# Patient Record
Sex: MALE | Employment: STUDENT | URBAN - METROPOLITAN AREA
[De-identification: names, ages, dates, MRNs, and addresses within clinical notes are randomized per-mention and may not be internally consistent; named-entity substitution may affect disease eponyms.]

---

## 2017-04-05 ENCOUNTER — OFFICE VISIT (OUTPATIENT)
Dept: FAMILY MEDICINE CLINIC | Facility: CLINIC | Age: 27
End: 2017-04-05

## 2017-04-05 VITALS
RESPIRATION RATE: 13 BRPM | HEART RATE: 74 BPM | SYSTOLIC BLOOD PRESSURE: 92 MMHG | HEIGHT: 70 IN | DIASTOLIC BLOOD PRESSURE: 56 MMHG | OXYGEN SATURATION: 99 % | WEIGHT: 210.63 LBS | TEMPERATURE: 98 F | BODY MASS INDEX: 30.15 KG/M2

## 2017-04-05 DIAGNOSIS — J40 BRONCHITIS: ICD-10-CM

## 2017-04-05 DIAGNOSIS — J01.00 ACUTE NON-RECURRENT MAXILLARY SINUSITIS: Primary | ICD-10-CM

## 2017-04-05 PROCEDURE — 99213 OFFICE O/P EST LOW 20 MIN: CPT | Performed by: PHYSICIAN ASSISTANT

## 2017-04-05 RX ORDER — AMOXICILLIN AND CLAVULANATE POTASSIUM 875; 125 MG/1; MG/1
1 TABLET, FILM COATED ORAL 2 TIMES DAILY
Qty: 20 TABLET | Refills: 0 | Status: SHIPPED | OUTPATIENT
Start: 2017-04-05 | End: 2017-04-15

## 2017-04-05 RX ORDER — FLUTICASONE PROPIONATE 50 MCG
2 SPRAY, SUSPENSION (ML) NASAL DAILY
Qty: 1 INHALER | Refills: 0 | Status: SHIPPED | OUTPATIENT
Start: 2017-04-05 | End: 2017-08-22

## 2017-04-05 RX ORDER — BENZONATATE 200 MG/1
200 CAPSULE ORAL 3 TIMES DAILY PRN
Qty: 30 CAPSULE | Refills: 0 | Status: SHIPPED | OUTPATIENT
Start: 2017-04-05 | End: 2017-04-15

## 2017-04-05 NOTE — PROGRESS NOTES
CHIEF COMPLAINT:   No chief complaint on file. HPI:   Madelin Harden is a 31year old male who presents for URI sxs for 1.5 weeks.   Patient reports nasal congestion-yellow, sinus pain/pressure, respiratory congestion, cough-productive-yellow, SOB EARS: TM's not erythematous, no bulging, no retraction, no fluid, bony landmarks intact. EACs WNL BL. NOSE: Nostrils patent, white nasal discharge, nasal mucosa inflamed   THROAT: oral mucosa pink, moist. Posterior pharynx not erythematous or injected. Bronchitis is an infection of the air passages (bronchial tubes) in your lungs. It often occurs when you have a cold.  This illness is contagious during the first few days and is spread through the air by coughing and sneezing, or by direct contact (touchin Follow up with your healthcare provider, or as advised. If you had an X-ray or ECG (electrocardiogram), a specialist will review it. You will be notified of any new findings that may affect your care.   Note: If you are age 72 or older, or if you have a chr ABRS most often follows an upper respiratory infection caused by a virus. Bacteria then infect the lining of your nasal cavity and sinuses.  But you can also get ABRS if you have:  · Nasal allergies  · Long-term nasal swelling and congestion not caused by a These problems may need to be treated in a hospital with intravenous (IV) antibiotic medicine or surgery.   When to call the health care provider  Call your health care provider if you have any of the following:  · Symptoms that don’t get better, or get wor

## 2017-04-05 NOTE — PATIENT INSTRUCTIONS
-Cool mist humidifier at night  -Warm tea with honey  -Mucinex-D  -Flonase  -Motrin for pain or fever          Bronchitis, Antibiotic Treatment (Adult)    Bronchitis is an infection of the air passages (bronchial tubes) in your lungs.  It often occurs when · Over-the-counter cough, cold, and sore-throat medicines will not shorten the length of the illness, but they may be helpful to reduce symptoms. (Note: Do not use decongestants if you have high blood pressure.)  · Finish all antibiotic medicine.  Do this e The nasal cavity is the large air-filled space behind your nose. The sinuses are a group of spaces formed by the bones of your face. They connect with your nasal cavity. ABRS causes the tissue lining these spaces to become inflamed.  Mucus may not drain nor ABRS may come back or become long-term (chronic).   In rare cases, ABRS may cause complications such as:   · Inflamed tissue around the brain and spinal cord (meningitis)  · Inflamed tissue around the eyes (orbital cellulitis)  · Inflamed bones around the s

## 2017-04-10 ENCOUNTER — OFFICE VISIT (OUTPATIENT)
Dept: FAMILY MEDICINE CLINIC | Facility: CLINIC | Age: 27
End: 2017-04-10

## 2017-04-10 VITALS
WEIGHT: 210.25 LBS | BODY MASS INDEX: 30.1 KG/M2 | SYSTOLIC BLOOD PRESSURE: 104 MMHG | TEMPERATURE: 98 F | DIASTOLIC BLOOD PRESSURE: 68 MMHG | HEIGHT: 70 IN

## 2017-04-10 DIAGNOSIS — G43.109 MIGRAINE WITH AURA AND WITHOUT STATUS MIGRAINOSUS, NOT INTRACTABLE: Primary | ICD-10-CM

## 2017-04-10 DIAGNOSIS — R51.9 FREQUENT HEADACHES: ICD-10-CM

## 2017-04-10 PROCEDURE — 99214 OFFICE O/P EST MOD 30 MIN: CPT | Performed by: FAMILY MEDICINE

## 2017-04-10 RX ORDER — SUMATRIPTAN 100 MG/1
100 TABLET, FILM COATED ORAL EVERY 2 HOUR PRN
Qty: 9 TABLET | Refills: 1 | Status: SHIPPED | OUTPATIENT
Start: 2017-04-10 | End: 2017-08-22

## 2017-04-10 RX ORDER — AMITRIPTYLINE HYDROCHLORIDE 25 MG/1
25 TABLET, FILM COATED ORAL NIGHTLY
Qty: 30 TABLET | Refills: 2 | Status: SHIPPED | OUTPATIENT
Start: 2017-04-10 | End: 2017-08-09

## 2017-04-10 NOTE — PROGRESS NOTES
HPI:    Patient ID: Yuliya Zafar is a 31year old male. Pt c/o headache - freq  W/o fever / chills  4/ month  W/o relief esgic  DCed elavil on his own  HPI    Review of Systems   Constitutional: Negative for fever and chills.    HENT: Negative for congestio encounter diagnosis)  Frequent headaches    No orders of the defined types were placed in this encounter.        Meds This Visit:  Signed Prescriptions Disp Refills    Amitriptyline HCl 25 MG Oral Tab 30 tablet 2      Sig: Take 1 tablet (25 mg total) by fortunato

## 2017-08-09 ENCOUNTER — OFFICE VISIT (OUTPATIENT)
Dept: FAMILY MEDICINE CLINIC | Facility: CLINIC | Age: 27
End: 2017-08-09

## 2017-08-09 VITALS
BODY MASS INDEX: 30.64 KG/M2 | HEART RATE: 64 BPM | WEIGHT: 214 LBS | TEMPERATURE: 98 F | SYSTOLIC BLOOD PRESSURE: 110 MMHG | HEIGHT: 70 IN | OXYGEN SATURATION: 99 % | DIASTOLIC BLOOD PRESSURE: 70 MMHG

## 2017-08-09 DIAGNOSIS — G43.109 MIGRAINE WITH AURA AND WITHOUT STATUS MIGRAINOSUS, NOT INTRACTABLE: Primary | ICD-10-CM

## 2017-08-09 DIAGNOSIS — R51.9 FREQUENT HEADACHES: ICD-10-CM

## 2017-08-09 PROCEDURE — 99214 OFFICE O/P EST MOD 30 MIN: CPT | Performed by: FAMILY MEDICINE

## 2017-08-09 RX ORDER — AMITRIPTYLINE HYDROCHLORIDE 50 MG/1
50 TABLET, FILM COATED ORAL NIGHTLY
Qty: 30 TABLET | Refills: 1 | Status: SHIPPED | OUTPATIENT
Start: 2017-08-09 | End: 2017-10-23

## 2017-08-09 RX ORDER — BUTALBITAL, ACETAMINOPHEN AND CAFFEINE 300; 40; 50 MG/1; MG/1; MG/1
1 CAPSULE ORAL EVERY 4 HOURS PRN
Qty: 30 CAPSULE | Refills: 0 | Status: SHIPPED | OUTPATIENT
Start: 2017-08-09 | End: 2017-08-23

## 2017-08-09 NOTE — PROGRESS NOTES
HPI:    Patient ID: Steve Mcdonald is a 31year old male.   Hit back of head - work prior  X 2 wks since head trauma - auras,neck tight,L temple  + nausea  W/o relief imitrix  W/o stress  Seen ER - CT neck / head neg  HPI    Review of Systems           Current Amitriptyline HCl 50 MG Oral Tab 30 tablet 1      Sig: Take 1 tablet (50 mg total) by mouth nightly. Butalbital-APAP-Caffeine -40 MG Oral Cap 30 capsule 0      Sig: Take 1 capsule by mouth every 4 (four) hours as needed for Pain.            Jes Hwang

## 2017-08-09 NOTE — PATIENT INSTRUCTIONS
Discussed neuro eval.  Increase amitriptyline to 50 mg. Fiorinal as needed headaches. Discussed activations. Call with questions or problems. Reevaluate in 2-4 weeks.

## 2017-08-22 ENCOUNTER — OFFICE VISIT (OUTPATIENT)
Dept: NEUROLOGY | Facility: CLINIC | Age: 27
End: 2017-08-22

## 2017-08-22 VITALS
HEIGHT: 69 IN | DIASTOLIC BLOOD PRESSURE: 77 MMHG | SYSTOLIC BLOOD PRESSURE: 121 MMHG | HEART RATE: 81 BPM | RESPIRATION RATE: 16 BRPM | WEIGHT: 214 LBS | BODY MASS INDEX: 31.7 KG/M2

## 2017-08-22 DIAGNOSIS — R41.0 CONFUSION AND DISORIENTATION: ICD-10-CM

## 2017-08-22 DIAGNOSIS — G43.119 INTRACTABLE MIGRAINE WITH AURA WITHOUT STATUS MIGRAINOSUS: Primary | ICD-10-CM

## 2017-08-22 PROCEDURE — 99244 OFF/OP CNSLTJ NEW/EST MOD 40: CPT | Performed by: OTHER

## 2017-08-22 RX ORDER — RIZATRIPTAN BENZOATE 10 MG/1
10 TABLET ORAL AS NEEDED
Qty: 12 TABLET | Refills: 0 | Status: SHIPPED | OUTPATIENT
Start: 2017-08-22 | End: 2017-09-21

## 2017-08-22 RX ORDER — TOPIRAMATE 25 MG/1
CAPSULE, COATED PELLETS ORAL
Qty: 120 CAPSULE | Refills: 1 | Status: SHIPPED | OUTPATIENT
Start: 2017-08-22 | End: 2017-10-23 | Stop reason: DRUGHIGH

## 2017-08-22 NOTE — PATIENT INSTRUCTIONS
Please schedule EEG prior to next visit  For abortive therapy, when migraine aura starts,  take rizatriptan 10 mg (Maxalt) within first 30 minutes of symptoms starting; if not improved after 2 hours, take additional dose, and then stop taking; monitor for 2-3 business days to fill the prescription. • Patient must present photo ID at time of .     • Written prescriptions picked up on Fridays must be picked up between the hours of 8:00 AM-1:00 PM.     Scheduling Tests    If your physician has ordered the test or put your hair in a ponytail. · You will be asked to relax with your eyes closed for a majority of the test and take a nap, if possible.   · After the test,  the leads will be removed and your head will be cleaned by technologist.  · The test i

## 2017-08-22 NOTE — H&P
[facility] New Patient / Consult Visit    Talia Payment is a 31year old male.                          Referring MD: Rossana Solis    Patient presents with:  Migraine: C/O of getting 3-4 migraines a month      HPI:    Talia Payment is a 31 ye pain, palpitations, shortness of breath, rashes, joint pains, bowel / bladder incontinence or mood issues. Past Medical History:   Diagnosis Date   • Migraines      History reviewed. No pertinent surgical history.   Smoking status: Never Smoker intact    Neck: Supple; full range of motion; no carotid bruits    Mental status:  Alert and oriented to time, place, person, and situation  Speech: fluent  Language: normal naming, repetition, and comprehension  Memory: normal  Attention/concentration: no appears consistent with his prior description with migraine with aura, which I suspect was exacerbated / precipitated by his recent head trauma. He otherwise, does not exhibit post concussive syndrome.   Given normal exam and recent CT brain, no additional [phone]  8/22/2017

## 2017-09-19 ENCOUNTER — NURSE ONLY (OUTPATIENT)
Dept: NEUROLOGY | Facility: CLINIC | Age: 27
End: 2017-09-19

## 2017-09-19 DIAGNOSIS — R41.0 CONFUSION AND DISORIENTATION: Primary | ICD-10-CM

## 2017-09-19 PROCEDURE — 95819 EEG AWAKE AND ASLEEP: CPT | Performed by: OTHER

## 2017-09-22 NOTE — PROCEDURES
[address] in [facility]  in affiliation with [facility]  [address]  [address]  [phone]  Fax [phone]      Name: Sis Johnson  9/25/1990  Date of Study: Epileptiform activity: None    Seizures: none    Events: none    Impression: This EEG is normal.  No epileptiform activity, abnormal slowing or clinical or electrographic seizures were noted on this awake and asleep  recording.      Jessie Brown MD

## 2017-09-26 ENCOUNTER — TELEPHONE (OUTPATIENT)
Dept: NEUROLOGY | Facility: CLINIC | Age: 27
End: 2017-09-26

## 2017-09-26 NOTE — TELEPHONE ENCOUNTER
----- Message from Camila Alejandra MD sent at 9/22/2017  9:41 AM CDT -----  Results noted, EEG normal; let patient know

## 2017-10-23 ENCOUNTER — OFFICE VISIT (OUTPATIENT)
Dept: NEUROLOGY | Facility: CLINIC | Age: 27
End: 2017-10-23

## 2017-10-23 VITALS — DIASTOLIC BLOOD PRESSURE: 68 MMHG | HEART RATE: 62 BPM | SYSTOLIC BLOOD PRESSURE: 106 MMHG | RESPIRATION RATE: 18 BRPM

## 2017-10-23 DIAGNOSIS — G43.119 INTRACTABLE MIGRAINE WITH AURA WITHOUT STATUS MIGRAINOSUS: Primary | ICD-10-CM

## 2017-10-23 PROCEDURE — 99213 OFFICE O/P EST LOW 20 MIN: CPT | Performed by: OTHER

## 2017-10-23 RX ORDER — RIZATRIPTAN BENZOATE 10 MG/1
10 TABLET ORAL AS NEEDED
Qty: 12 TABLET | Refills: 5 | Status: SHIPPED | OUTPATIENT
Start: 2017-10-23 | End: 2018-04-27

## 2017-10-23 RX ORDER — RIZATRIPTAN BENZOATE 10 MG/1
10 TABLET ORAL AS NEEDED
COMMUNITY
End: 2017-10-23

## 2017-10-23 RX ORDER — TOPIRAMATE 50 MG/1
50 TABLET, FILM COATED ORAL 2 TIMES DAILY
COMMUNITY
End: 2017-10-23 | Stop reason: SDUPTHER

## 2017-10-23 RX ORDER — TOPIRAMATE 50 MG/1
50 TABLET, FILM COATED ORAL 2 TIMES DAILY
Qty: 60 TABLET | Refills: 5 | Status: SHIPPED | OUTPATIENT
Start: 2017-10-23 | End: 2018-04-27

## 2017-10-23 NOTE — PATIENT INSTRUCTIONS
Refill policies:    • Allow 2-3 business days for refills; controlled substances may take longer.   • Contact your pharmacy at least 5 days prior to running out of medication and have them send an electronic request or submit request through the [facility] have a procedure or additional testing performed. [facility]) will contact your insurance carrier to obtain pre-certification or prior authorization.     Unfortunately, [facility] has seen an increase in denial of payment even though the p

## 2017-10-23 NOTE — PROGRESS NOTES
[facility] Progress Note    HPI  Patient presents with:  Headache      As per my initial H&P from 8/22/17:    Andra Lino is a 31year old, who presents for evaluation of headaches.        Patient states he has history of migraines sin \"       Overall, since last visit, he has been doing much better; states he had 2 migraines since last visit - each one with visual aura, but no n/v or confusion - with each one, he took Maxalt, with improvement after 1 pill - denied any major side effect acute distress  HEENT: normocephalic  Heart; normal W4/D8, regular rate and rhythm  Lungs: clear to auscultation bilaterally  Extremities: no edema, peripheral pulses intact    Neck: supple, full range of motion; no carotid bruits    Neuro:  Mental status: diagnosis)  Plan: topiramate 50 MG Oral Tab, Rizatriptan Benzoate        10 MG Oral Tab        As noted above    Return in about 6 months (around 4/23/2018).       Devin Rivers MD, Neurology  [facility]  Pager [phone]  10/23/2017

## 2017-11-08 ENCOUNTER — MED REC SCAN ONLY (OUTPATIENT)
Dept: FAMILY MEDICINE CLINIC | Facility: CLINIC | Age: 27
End: 2017-11-08

## 2017-11-27 ENCOUNTER — OFFICE VISIT (OUTPATIENT)
Dept: FAMILY MEDICINE CLINIC | Facility: CLINIC | Age: 27
End: 2017-11-27

## 2017-11-27 VITALS
TEMPERATURE: 98 F | RESPIRATION RATE: 16 BRPM | DIASTOLIC BLOOD PRESSURE: 80 MMHG | OXYGEN SATURATION: 99 % | BODY MASS INDEX: 30 KG/M2 | HEART RATE: 80 BPM | WEIGHT: 201.63 LBS | SYSTOLIC BLOOD PRESSURE: 102 MMHG

## 2017-11-27 DIAGNOSIS — J02.9 SORE THROAT: Primary | ICD-10-CM

## 2017-11-27 DIAGNOSIS — R05.9 COUGH: ICD-10-CM

## 2017-11-27 PROCEDURE — 99213 OFFICE O/P EST LOW 20 MIN: CPT | Performed by: FAMILY MEDICINE

## 2017-11-27 PROCEDURE — 87880 STREP A ASSAY W/OPTIC: CPT | Performed by: FAMILY MEDICINE

## 2017-11-27 PROCEDURE — 87081 CULTURE SCREEN ONLY: CPT | Performed by: FAMILY MEDICINE

## 2017-11-27 RX ORDER — AZITHROMYCIN 250 MG/1
TABLET, FILM COATED ORAL
Qty: 6 TABLET | Refills: 0 | Status: SHIPPED | OUTPATIENT
Start: 2017-11-27 | End: 2018-04-27 | Stop reason: ALTCHOICE

## 2017-11-27 NOTE — PROGRESS NOTES
HPI:    Patient ID: Muna Martinez is a 28year old male. Patient presents with:  Sore Throat: sore throat, cough x 1 week      HPI  Patient is here for cough and sore throat for 8 days. States cough is productive of Greenish sputum.  States symptoms are wo occas       PHYSICAL EXAM:   Physical Exam   Constitutional: He appears well-developed. HENT:   Head: Normocephalic. Right Ear: External ear normal.   Left Ear: External ear normal.   Mouth/Throat: Posterior oropharyngeal erythema present.  No oropharyn

## 2017-11-28 ENCOUNTER — OFFICE VISIT (OUTPATIENT)
Dept: FAMILY MEDICINE CLINIC | Facility: CLINIC | Age: 27
End: 2017-11-28

## 2017-11-28 VITALS
HEART RATE: 64 BPM | TEMPERATURE: 98 F | BODY MASS INDEX: 29 KG/M2 | RESPIRATION RATE: 16 BRPM | WEIGHT: 199 LBS | DIASTOLIC BLOOD PRESSURE: 70 MMHG | SYSTOLIC BLOOD PRESSURE: 120 MMHG

## 2017-11-28 DIAGNOSIS — M25.572 ACUTE LEFT ANKLE PAIN: ICD-10-CM

## 2017-11-28 DIAGNOSIS — S93.412A SPRAIN OF CALCANEOFIBULAR LIGAMENT OF LEFT ANKLE, INITIAL ENCOUNTER: Primary | ICD-10-CM

## 2017-11-28 PROCEDURE — L4350 ANKLE CONTROL ORTHO PRE OTS: HCPCS | Performed by: FAMILY MEDICINE

## 2017-11-28 PROCEDURE — 99214 OFFICE O/P EST MOD 30 MIN: CPT | Performed by: FAMILY MEDICINE

## 2017-11-28 NOTE — PATIENT INSTRUCTIONS
Range of motion exercises reviewed with patient. Alternate ice and heat. Air splint. Call with questions or problems.

## 2017-11-28 NOTE — PROGRESS NOTES
HPI:    Patient ID: Ginette Ma is a 28year old male. 10 days ago twisted L ankle  advil / ice / ace   + swelling ext ankle  W/o foot / Knee pain  HPI    Review of Systems   Musculoskeletal: Negative for joint swelling.    Neurological: Negative for weakn

## 2018-03-17 ENCOUNTER — TELEPHONE (OUTPATIENT)
Dept: FAMILY MEDICINE CLINIC | Facility: CLINIC | Age: 28
End: 2018-03-17

## 2018-03-17 DIAGNOSIS — Z11.1 SCREENING FOR TUBERCULOSIS: Primary | ICD-10-CM

## 2018-03-21 ENCOUNTER — NURSE ONLY (OUTPATIENT)
Dept: FAMILY MEDICINE CLINIC | Facility: CLINIC | Age: 28
End: 2018-03-21

## 2018-03-21 DIAGNOSIS — Z11.1 SCREENING FOR TUBERCULOSIS: Primary | ICD-10-CM

## 2018-03-21 PROCEDURE — 86580 TB INTRADERMAL TEST: CPT | Performed by: FAMILY MEDICINE

## 2018-03-23 ENCOUNTER — NURSE ONLY (OUTPATIENT)
Dept: FAMILY MEDICINE CLINIC | Facility: CLINIC | Age: 28
End: 2018-03-23

## 2018-03-23 DIAGNOSIS — Z11.1 SCREENING FOR TUBERCULOSIS: Primary | ICD-10-CM

## 2018-03-23 LAB — INDURATION (): 0 MM (ref 0–11)

## 2018-04-27 ENCOUNTER — OFFICE VISIT (OUTPATIENT)
Dept: NEUROLOGY | Facility: CLINIC | Age: 28
End: 2018-04-27

## 2018-04-27 VITALS — SYSTOLIC BLOOD PRESSURE: 112 MMHG | RESPIRATION RATE: 18 BRPM | DIASTOLIC BLOOD PRESSURE: 70 MMHG | HEART RATE: 70 BPM

## 2018-04-27 DIAGNOSIS — G43.119 INTRACTABLE MIGRAINE WITH AURA WITHOUT STATUS MIGRAINOSUS: Primary | ICD-10-CM

## 2018-04-27 PROCEDURE — 99213 OFFICE O/P EST LOW 20 MIN: CPT | Performed by: OTHER

## 2018-04-27 RX ORDER — RIZATRIPTAN BENZOATE 10 MG/1
10 TABLET ORAL AS NEEDED
Qty: 12 TABLET | Refills: 5 | Status: SHIPPED | OUTPATIENT
Start: 2018-04-27 | End: 2019-07-17

## 2018-04-27 RX ORDER — TOPIRAMATE 50 MG/1
50 TABLET, FILM COATED ORAL 2 TIMES DAILY
Qty: 60 TABLET | Refills: 5 | Status: SHIPPED | OUTPATIENT
Start: 2018-04-27 | End: 2018-12-05

## 2018-04-27 NOTE — PATIENT INSTRUCTIONS
Refill policies:    • Allow 2-3 business days for refills; controlled substances may take longer.   • Contact your pharmacy at least 5 days prior to running out of medication and have them send an electronic request or submit request through the [facility] entire amount billed. Precertification and Prior Authorizations: If your physician has recommended that you have a procedure or additional testing performed.   [facility]) will contact your insurance carrier to obtain pre-certi

## 2018-04-27 NOTE — PROGRESS NOTES
[facility] Progress Note    HPI  Patient presents with:  Migraine      As per my initial H&P from 8/22/17:    Jenniffer Villasenor is a 31year old, who presents for evaluation of headaches.        Patient states he has history of migraines sin \"          Patient since last visit has been doing well - ~12 migraines in past 6 months - states he averages about 3 migraines a month and does not always have to take Maxalt to help.   When he takes Maxalt 10 mg, it words every time and within ~30 minute bilaterally  Extremities: no edema, peripheral pulses intact    Neck: supple, full range of motion; no carotid bruits    Neuro:  Mental status:  Orientation: Alert and oriented to person, place, time  Speech Fluent and conversational    CN: [facility], [facility] wit [facility]  Pager [phone]  4/27/2018

## 2018-07-07 ENCOUNTER — APPOINTMENT (OUTPATIENT)
Dept: FAMILY MEDICINE CLINIC | Facility: CLINIC | Age: 28
End: 2018-07-07

## 2018-07-07 ENCOUNTER — NURSE ONLY (OUTPATIENT)
Dept: FAMILY MEDICINE CLINIC | Facility: CLINIC | Age: 28
End: 2018-07-07

## 2018-07-07 VITALS
TEMPERATURE: 98 F | RESPIRATION RATE: 20 BRPM | OXYGEN SATURATION: 98 % | DIASTOLIC BLOOD PRESSURE: 78 MMHG | HEART RATE: 80 BPM | SYSTOLIC BLOOD PRESSURE: 118 MMHG

## 2018-07-07 DIAGNOSIS — B30.9 VIRAL CONJUNCTIVITIS OF LEFT EYE: Primary | ICD-10-CM

## 2018-07-07 PROCEDURE — 99202 OFFICE O/P NEW SF 15 MIN: CPT | Performed by: NURSE PRACTITIONER

## 2018-07-07 RX ORDER — TOPIRAMATE 50 MG/1
50 TABLET, FILM COATED ORAL 2 TIMES DAILY
COMMUNITY
End: 2019-01-02 | Stop reason: ALTCHOICE

## 2018-07-07 NOTE — PATIENT INSTRUCTIONS
Viral Conjunctivitis    Viral conjunctivitis (sometimes called pink eye) is a common infection of the eye. It is very contagious. Touching the infected eye, then touching another person passes this infection.  It can also be spread from one eye to the oth · This illness is contagious during the first week. Children with this illness should be kept out of day care and school until the redness clears. Follow-up care  Follow up with your healthcare provider, or as advised.   When to seek medical advice  Call y

## 2018-07-07 NOTE — PROGRESS NOTES
CHIEF COMPLAINT:   Patient presents with:  Eye Problem      HPI:   Arben Magana is a 28year old male who presents with chief complaint of \"pink eye\". Symptoms began  1  days ago. Symptoms have been constant since onset.    Patient reports left eye redness PLAN: Hygiene and comfort care as listed in patient instructions. Medication and instructions as listed below  Warm compresses to affected eye prn. Advised patient to avoid touching eyes; importancestressed  of good handwashing.       Follow up with PCP · You may use acetaminophen or ibuprofen to control pain, unless another medicine was prescribed.  (Note: If you have chronic liver or kidney disease, or if you have ever had a stomach ulcer or gastrointestinal bleeding, talk with your healthcare provider b

## 2018-08-14 ENCOUNTER — OFFICE VISIT (OUTPATIENT)
Dept: FAMILY MEDICINE CLINIC | Facility: CLINIC | Age: 28
End: 2018-08-14
Payer: OTHER GOVERNMENT

## 2018-08-14 VITALS
HEART RATE: 60 BPM | HEIGHT: 69.25 IN | SYSTOLIC BLOOD PRESSURE: 110 MMHG | DIASTOLIC BLOOD PRESSURE: 70 MMHG | RESPIRATION RATE: 12 BRPM | WEIGHT: 209 LBS | BODY MASS INDEX: 30.6 KG/M2 | TEMPERATURE: 98 F

## 2018-08-14 DIAGNOSIS — Z23 NEED FOR VACCINATION: ICD-10-CM

## 2018-08-14 DIAGNOSIS — Z00.00 ROUTINE ADULT HEALTH MAINTENANCE: Primary | ICD-10-CM

## 2018-08-14 PROCEDURE — 90471 IMMUNIZATION ADMIN: CPT | Performed by: FAMILY MEDICINE

## 2018-08-14 PROCEDURE — 90715 TDAP VACCINE 7 YRS/> IM: CPT | Performed by: FAMILY MEDICINE

## 2018-08-14 PROCEDURE — 99395 PREV VISIT EST AGE 18-39: CPT | Performed by: FAMILY MEDICINE

## 2018-08-14 NOTE — PROGRESS NOTES
Justin Rey is a 28year old male. Patient presents with:  Employment Physical: student teaching inrm .  8       HPI:   Px - work  W/o problems or concerns    Current Outpatient Prescriptions on File Prior to Visit:  topiramate 50 MG Oral Tab Take 1 tabl Placed This Encounter      TdaP (Boostrix/Adacel) Vaccine (> 7 Y)    Meds & Refills for this Visit:  No prescriptions requested or ordered in this encounter    Imaging & Consults:  TETANUS, DIPHTHERIA TOXOIDS AND ACELLULAR PERTUSIS VACCINE (TDAP), >7 YEARS

## 2018-12-05 DIAGNOSIS — G43.119 INTRACTABLE MIGRAINE WITH AURA WITHOUT STATUS MIGRAINOSUS: ICD-10-CM

## 2018-12-05 RX ORDER — TOPIRAMATE 50 MG/1
50 TABLET, FILM COATED ORAL 2 TIMES DAILY
Qty: 60 TABLET | Refills: 1 | Status: SHIPPED | OUTPATIENT
Start: 2018-12-05 | End: 2019-01-02 | Stop reason: ALTCHOICE

## 2018-12-05 NOTE — TELEPHONE ENCOUNTER
Medication: Topamax 50 mg    Date of last refill: 04/27/18 with 5 addt refills  [place]     Last office visit: 04/27/18  Due back to clinic per last office note:  RTN in 6 months  Date next office visit scheduled:     Fut

## 2018-12-06 RX ORDER — TOPIRAMATE 100 MG/1
50 TABLET, FILM COATED ORAL 2 TIMES DAILY
Qty: 30 TABLET | Refills: 0 | Status: SHIPPED | OUTPATIENT
Start: 2018-12-06 | End: 2019-01-09 | Stop reason: DRUGHIGH

## 2018-12-06 NOTE — TELEPHONE ENCOUNTER
Received notification that topiramate 50 mg is back ordered; can substitution be made with 100 mg, 0.5 tablets?     Order pended for MD approval.

## 2019-01-02 ENCOUNTER — HOSPITAL ENCOUNTER (OUTPATIENT)
Dept: GENERAL RADIOLOGY | Age: 29
Discharge: HOME | End: 2019-01-02
Attending: FAMILY MEDICINE
Payer: OTHER GOVERNMENT

## 2019-01-02 ENCOUNTER — OFFICE VISIT (OUTPATIENT)
Dept: FAMILY MEDICINE CLINIC | Facility: CLINIC | Age: 29
End: 2019-01-02
Payer: OTHER GOVERNMENT

## 2019-01-02 VITALS
DIASTOLIC BLOOD PRESSURE: 80 MMHG | WEIGHT: 220.13 LBS | OXYGEN SATURATION: 96 % | SYSTOLIC BLOOD PRESSURE: 110 MMHG | TEMPERATURE: 98 F | BODY MASS INDEX: 32 KG/M2 | HEART RATE: 89 BPM

## 2019-01-02 DIAGNOSIS — M25.562 ACUTE PAIN OF LEFT KNEE: ICD-10-CM

## 2019-01-02 DIAGNOSIS — M22.2X2 PATELLOFEMORAL PAIN SYNDROME OF LEFT KNEE: Primary | ICD-10-CM

## 2019-01-02 DIAGNOSIS — M22.2X2 PATELLOFEMORAL PAIN SYNDROME OF LEFT KNEE: ICD-10-CM

## 2019-01-02 PROCEDURE — 99214 OFFICE O/P EST MOD 30 MIN: CPT | Performed by: FAMILY MEDICINE

## 2019-01-02 PROCEDURE — 73560 X-RAY EXAM OF KNEE 1 OR 2: CPT | Performed by: FAMILY MEDICINE

## 2019-01-02 RX ORDER — MELOXICAM 15 MG/1
15 TABLET ORAL DAILY
Qty: 30 TABLET | Refills: 1 | Status: SHIPPED | OUTPATIENT
Start: 2019-01-02 | End: 2019-02-26 | Stop reason: ALTCHOICE

## 2019-01-02 NOTE — PROGRESS NOTES
HPI:    Patient ID: Soy Hunter is a 33year old male.   L knee pain x 1 month  No trauma per pt  Swelled initially  Pain ant knee / pressure feeling  Worse stairs / changing position  HPI    Review of Systems           Current Outpatient Medications:  to

## 2019-01-08 DIAGNOSIS — G43.119 INTRACTABLE MIGRAINE WITH AURA WITHOUT STATUS MIGRAINOSUS: ICD-10-CM

## 2019-01-09 RX ORDER — TOPIRAMATE 50 MG/1
TABLET, FILM COATED ORAL
Qty: 60 TABLET | Refills: 5 | Status: SHIPPED | OUTPATIENT
Start: 2019-01-09 | End: 2019-01-23

## 2019-01-09 NOTE — TELEPHONE ENCOUNTER
Medication: Topamax 50 mg     Date of last refill: 12/06/18  [place]      Last office visit: 04/27/18  Due back to clinic per last office note:  RTN in 6 months  Date next office visit scheduled:           Future Appoint

## 2019-01-23 ENCOUNTER — OFFICE VISIT (OUTPATIENT)
Dept: NEUROLOGY | Facility: CLINIC | Age: 29
End: 2019-01-23
Payer: OTHER GOVERNMENT

## 2019-01-23 VITALS
HEIGHT: 69.25 IN | SYSTOLIC BLOOD PRESSURE: 120 MMHG | RESPIRATION RATE: 16 BRPM | WEIGHT: 220 LBS | HEART RATE: 74 BPM | BODY MASS INDEX: 32.22 KG/M2 | DIASTOLIC BLOOD PRESSURE: 72 MMHG

## 2019-01-23 DIAGNOSIS — G43.119 INTRACTABLE MIGRAINE WITH AURA WITHOUT STATUS MIGRAINOSUS: ICD-10-CM

## 2019-01-23 PROCEDURE — 99213 OFFICE O/P EST LOW 20 MIN: CPT | Performed by: OTHER

## 2019-01-23 RX ORDER — TOPIRAMATE 25 MG/1
25 TABLET ORAL 2 TIMES DAILY
Qty: 60 TABLET | Refills: 2 | Status: SHIPPED | OUTPATIENT
Start: 2019-01-23 | End: 2019-03-20

## 2019-01-23 NOTE — PROGRESS NOTES
[facility]    HPI  Patient presents with:  Migraine: Follow up      As per my initial H&P from 8/22/17:    Crys Rodrigez is a 31year old, who presents for evaluation of headaches.        Patient states he has history of mi mood issues.  \"        Patient currently doing well with regards to migraines - states he may have had 2-3 migraines since last visit; gets aura and dulls when he takes Maxalt - thinks last migraine was 2-3 months ago - was home watching TV - saw declangly 120/72 (BP Location: Left arm, Patient Position: Sitting, Cuff Size: large)   Pulse 74   Resp 16   Ht 69.25\"   Wt 220 lb   BMI 32.25 kg/m²   Estimated body mass index is 32.25 kg/m² as calculated from the following:    Height as of this encounter: 69.25\" subclinical seizures, as well.          He continues to have migraines, but very infrequent and well controled on  Topamax at 50 mg bid, with Rizatriptan 10 mg PRN; given his good control and reported pruritis, will try weaning Topamax to 25 mg bid     (G43

## 2019-01-23 NOTE — PATIENT INSTRUCTIONS
Refill policies:    • Allow 2-3 business days for refills; controlled substances may take longer.   • Contact your pharmacy at least 5 days prior to running out of medication and have them send an electronic request or submit request through the “request re entire amount billed. Precertification and Prior Authorizations: If your physician has recommended that you have a procedure or additional testing performed.   [facility]) will contact your insurance carrier to obtain pre-certi

## 2019-02-26 ENCOUNTER — HOSPITAL ENCOUNTER (OUTPATIENT)
Dept: GENERAL RADIOLOGY | Age: 29
Discharge: HOME | End: 2019-02-26
Attending: FAMILY MEDICINE
Payer: OTHER GOVERNMENT

## 2019-02-26 ENCOUNTER — OFFICE VISIT (OUTPATIENT)
Dept: FAMILY MEDICINE CLINIC | Facility: CLINIC | Age: 29
End: 2019-02-26
Payer: OTHER GOVERNMENT

## 2019-02-26 VITALS
BODY MASS INDEX: 31 KG/M2 | WEIGHT: 212.38 LBS | HEART RATE: 90 BPM | OXYGEN SATURATION: 99 % | DIASTOLIC BLOOD PRESSURE: 80 MMHG | SYSTOLIC BLOOD PRESSURE: 110 MMHG | TEMPERATURE: 98 F

## 2019-02-26 DIAGNOSIS — M25.571 ACUTE RIGHT ANKLE PAIN: ICD-10-CM

## 2019-02-26 DIAGNOSIS — M10.071 ACUTE IDIOPATHIC GOUT OF RIGHT ANKLE: Primary | ICD-10-CM

## 2019-02-26 PROCEDURE — 99214 OFFICE O/P EST MOD 30 MIN: CPT | Performed by: FAMILY MEDICINE

## 2019-02-26 PROCEDURE — 96372 THER/PROPH/DIAG INJ SC/IM: CPT | Performed by: FAMILY MEDICINE

## 2019-02-26 PROCEDURE — 73610 X-RAY EXAM OF ANKLE: CPT | Performed by: FAMILY MEDICINE

## 2019-02-26 RX ORDER — KETOROLAC TROMETHAMINE 30 MG/ML
60 INJECTION, SOLUTION INTRAMUSCULAR; INTRAVENOUS ONCE
Status: COMPLETED | OUTPATIENT
Start: 2019-02-26 | End: 2019-02-26

## 2019-02-26 RX ADMIN — KETOROLAC TROMETHAMINE 60 MG: 30 INJECTION, SOLUTION INTRAMUSCULAR; INTRAVENOUS at 16:07:00

## 2019-03-20 DIAGNOSIS — G43.119 INTRACTABLE MIGRAINE WITH AURA WITHOUT STATUS MIGRAINOSUS: ICD-10-CM

## 2019-03-20 NOTE — TELEPHONE ENCOUNTER
Pt's topiramate dose was lowered to 25 mg BID, but he started getting migraines again. Pt went back to previous dose of 50 mg BID and migraines are gone. He needs a new rx, with higher dose, sent to [facility] in [place].

## 2019-03-20 NOTE — TELEPHONE ENCOUNTER
Medication: Topiramate    Date of last refill: 1/23/2019 (#60/2)  [place] for [place]    Last office visit: 1/23/2019, dose decreased at office visit  Due back to clinic per last office note:  RTC in 6 months

## 2019-03-22 RX ORDER — TOPIRAMATE 50 MG/1
50 TABLET, FILM COATED ORAL 2 TIMES DAILY
Qty: 60 TABLET | Refills: 3 | Status: SHIPPED | OUTPATIENT
Start: 2019-03-22 | End: 2019-04-25

## 2019-03-29 ENCOUNTER — TELEPHONE (OUTPATIENT)
Dept: FAMILY MEDICINE CLINIC | Facility: CLINIC | Age: 29
End: 2019-03-29

## 2019-03-29 NOTE — TELEPHONE ENCOUNTER
----- Message from Cora Feliz sent at 3/29/2019 12:12 PM CDT -----  Contact: ESSIE FOUNTAIN MISSED A CALL FROM BOGDAN, PLEASE CALL HER BACK.

## 2019-03-29 NOTE — TELEPHONE ENCOUNTER
MAGALY WAS IN THE ER [facility] MORNING @0400 AT [address]. THEY DID CONFIRM HE HAS GOUT.   WILL  PRESCRIBE A MED FOR HIM WITHOUT HIM BEING SEEN?

## 2019-03-29 NOTE — TELEPHONE ENCOUNTER
Uric acid- 9.7 at [facility] on 3/27/2019. Advised- push water; stop drinking pop and alcohol; see Dr Jean Whitaker next week.   Basilio/cj    [id number]

## 2019-04-01 ENCOUNTER — OFFICE VISIT (OUTPATIENT)
Dept: FAMILY MEDICINE CLINIC | Facility: CLINIC | Age: 29
End: 2019-04-01
Payer: OTHER GOVERNMENT

## 2019-04-01 VITALS
BODY MASS INDEX: 30.62 KG/M2 | DIASTOLIC BLOOD PRESSURE: 78 MMHG | HEART RATE: 80 BPM | OXYGEN SATURATION: 99 % | WEIGHT: 211.5 LBS | HEIGHT: 69.5 IN | SYSTOLIC BLOOD PRESSURE: 112 MMHG | TEMPERATURE: 99 F

## 2019-04-01 DIAGNOSIS — M10.071 IDIOPATHIC GOUT OF RIGHT ANKLE, UNSPECIFIED CHRONICITY: Primary | ICD-10-CM

## 2019-04-01 DIAGNOSIS — E79.0 HYPERURICEMIA: ICD-10-CM

## 2019-04-01 PROCEDURE — 99214 OFFICE O/P EST MOD 30 MIN: CPT | Performed by: FAMILY MEDICINE

## 2019-04-01 RX ORDER — INDOMETHACIN 50 MG/1
50 CAPSULE ORAL
Qty: 60 CAPSULE | Refills: 0 | Status: SHIPPED | OUTPATIENT
Start: 2019-04-01 | End: 2020-08-10

## 2019-04-01 RX ORDER — ALLOPURINOL 300 MG/1
300 TABLET ORAL DAILY
Qty: 90 TABLET | Refills: 0 | Status: SHIPPED | OUTPATIENT
Start: 2019-04-01 | End: 2019-04-27

## 2019-04-01 RX ORDER — INDOMETHACIN 50 MG/1
50 CAPSULE ORAL
COMMUNITY
Start: 2019-03-27 | End: 2019-05-06

## 2019-04-01 RX ORDER — INDOMETHACIN 50 MG/1
50 CAPSULE ORAL ONCE
Qty: 60 CAPSULE | Refills: 3 | Status: SHIPPED | OUTPATIENT
Start: 2019-04-01 | End: 2019-04-01 | Stop reason: CLARIF

## 2019-04-01 NOTE — PATIENT INSTRUCTIONS
Increase water  Discussed diet  Cont indocin x 1 more week  Check BASIC MET / URIC ACID 6 wks  Call ?  Or problems

## 2019-04-25 DIAGNOSIS — G43.119 INTRACTABLE MIGRAINE WITH AURA WITHOUT STATUS MIGRAINOSUS: ICD-10-CM

## 2019-04-25 RX ORDER — TOPIRAMATE 50 MG/1
50 TABLET, FILM COATED ORAL 2 TIMES DAILY
Qty: 180 TABLET | Refills: 1 | Status: SHIPPED | OUTPATIENT
Start: 2019-04-25 | End: 2019-08-02

## 2019-04-25 RX ORDER — TOPIRAMATE 50 MG/1
50 TABLET, FILM COATED ORAL 2 TIMES DAILY
Qty: 180 TABLET | Refills: 1 | OUTPATIENT
Start: 2019-04-25

## 2019-04-25 NOTE — TELEPHONE ENCOUNTER
Medication: Topamax 50 mg  Date of last refill: 03/22/19 with 3 addt refills  [place]     Last office visit: 1/23/2019  Due back to clinic per last office note:  RTN in 6 months  Date next office visit scheduled:     Misael

## 2019-04-27 ENCOUNTER — TELEPHONE (OUTPATIENT)
Dept: FAMILY MEDICINE CLINIC | Facility: CLINIC | Age: 29
End: 2019-04-27

## 2019-04-27 DIAGNOSIS — M10.071 IDIOPATHIC GOUT OF RIGHT ANKLE, UNSPECIFIED CHRONICITY: ICD-10-CM

## 2019-04-27 DIAGNOSIS — E79.0 HYPERURICEMIA: ICD-10-CM

## 2019-04-27 RX ORDER — ALLOPURINOL 300 MG/1
300 TABLET ORAL DAILY
Qty: 90 TABLET | Refills: 0 | Status: SHIPPED | OUTPATIENT
Start: 2019-04-27 | End: 2019-07-15

## 2019-05-06 ENCOUNTER — OFFICE VISIT (OUTPATIENT)
Dept: FAMILY MEDICINE CLINIC | Facility: CLINIC | Age: 29
End: 2019-05-06
Payer: OTHER GOVERNMENT

## 2019-05-06 VITALS
DIASTOLIC BLOOD PRESSURE: 78 MMHG | HEART RATE: 92 BPM | WEIGHT: 211 LBS | TEMPERATURE: 98 F | SYSTOLIC BLOOD PRESSURE: 120 MMHG | OXYGEN SATURATION: 98 % | BODY MASS INDEX: 31 KG/M2

## 2019-05-06 DIAGNOSIS — M10.071 IDIOPATHIC GOUT OF RIGHT ANKLE, UNSPECIFIED CHRONICITY: Primary | ICD-10-CM

## 2019-05-06 DIAGNOSIS — E79.0 HYPERURICEMIA: ICD-10-CM

## 2019-05-06 PROCEDURE — 80048 BASIC METABOLIC PNL TOTAL CA: CPT | Performed by: FAMILY MEDICINE

## 2019-05-06 PROCEDURE — 36415 COLL VENOUS BLD VENIPUNCTURE: CPT | Performed by: FAMILY MEDICINE

## 2019-05-06 PROCEDURE — 99214 OFFICE O/P EST MOD 30 MIN: CPT | Performed by: FAMILY MEDICINE

## 2019-05-06 PROCEDURE — 84550 ASSAY OF BLOOD/URIC ACID: CPT | Performed by: FAMILY MEDICINE

## 2019-05-06 NOTE — PROGRESS NOTES
HPI:    Patient ID: Vasyl Cummings is a 33year old male. Pt doing well w/ meds  Pain improved  Some stiffness persists  W/o redness  Over all better  HPI    Review of Systems   Musculoskeletal: Negative for joint swelling.    Neurological: Negative for wea

## 2019-05-06 NOTE — PATIENT INSTRUCTIONS
Range of motion exercises reviewed with right ankle. Isometrics. Alternate ice and heat. Roll calf twice daily. Massage foot and calf daily. Continue current medications. Call with laboratory studies. Call with questions or problems.

## 2019-05-07 DIAGNOSIS — E79.0 HYPERURICEMIA: Primary | ICD-10-CM

## 2019-05-08 DIAGNOSIS — M10.071 IDIOPATHIC GOUT OF RIGHT ANKLE, UNSPECIFIED CHRONICITY: ICD-10-CM

## 2019-05-08 DIAGNOSIS — E79.0 HYPERURICEMIA: Primary | ICD-10-CM

## 2019-07-14 DIAGNOSIS — M10.071 IDIOPATHIC GOUT OF RIGHT ANKLE, UNSPECIFIED CHRONICITY: ICD-10-CM

## 2019-07-14 DIAGNOSIS — E79.0 HYPERURICEMIA: ICD-10-CM

## 2019-07-14 RX ORDER — ALLOPURINOL 300 MG/1
TABLET ORAL
Qty: 90 TABLET | Refills: 0 | Status: CANCELLED | OUTPATIENT
Start: 2019-07-14

## 2019-07-15 RX ORDER — ALLOPURINOL 300 MG/1
TABLET ORAL
Qty: 90 TABLET | Refills: 0 | Status: SHIPPED | OUTPATIENT
Start: 2019-07-15 | End: 2019-07-17

## 2019-07-17 ENCOUNTER — OFFICE VISIT (OUTPATIENT)
Dept: FAMILY MEDICINE CLINIC | Facility: CLINIC | Age: 29
End: 2019-07-17
Payer: OTHER GOVERNMENT

## 2019-07-17 VITALS
HEART RATE: 72 BPM | SYSTOLIC BLOOD PRESSURE: 110 MMHG | BODY MASS INDEX: 30.71 KG/M2 | DIASTOLIC BLOOD PRESSURE: 68 MMHG | OXYGEN SATURATION: 98 % | HEIGHT: 69.5 IN | TEMPERATURE: 98 F | RESPIRATION RATE: 18 BRPM | WEIGHT: 212.13 LBS

## 2019-07-17 DIAGNOSIS — M10.071 IDIOPATHIC GOUT OF RIGHT ANKLE, UNSPECIFIED CHRONICITY: ICD-10-CM

## 2019-07-17 DIAGNOSIS — Z00.00 ROUTINE ADULT HEALTH MAINTENANCE: Primary | ICD-10-CM

## 2019-07-17 DIAGNOSIS — E79.0 HYPERURICEMIA: ICD-10-CM

## 2019-07-17 DIAGNOSIS — G43.119 INTRACTABLE MIGRAINE WITH AURA WITHOUT STATUS MIGRAINOSUS: ICD-10-CM

## 2019-07-17 PROCEDURE — 99395 PREV VISIT EST AGE 18-39: CPT | Performed by: FAMILY MEDICINE

## 2019-07-17 RX ORDER — ALLOPURINOL 300 MG/1
TABLET ORAL
Qty: 90 TABLET | Refills: 0 | Status: SHIPPED | OUTPATIENT
Start: 2019-07-17 | End: 2019-11-09

## 2019-07-17 RX ORDER — RIZATRIPTAN BENZOATE 10 MG/1
10 TABLET ORAL AS NEEDED
Qty: 12 TABLET | Refills: 5 | Status: SHIPPED | OUTPATIENT
Start: 2019-07-17 | End: 2021-09-28

## 2019-07-17 NOTE — PROGRESS NOTES
Viji Woody is a 33year old male.   Patient presents with:  Physical: pre employment physical...room 8      HPI:   Work Px  W/o problems or concerns    Current Outpatient Medications on File Prior to Visit:  topiramate 50 MG Oral Tab Take 1 tablet (50 mg unspecified chronicity  Hyperuricemia  Routine adult health maintenance  (primary encounter diagnosis)    No orders of the defined types were placed in this encounter.       Meds & Refills for this Visit:  Requested Prescriptions     Signed Prescriptions Yuly Tracey

## 2019-07-17 NOTE — TELEPHONE ENCOUNTER
Medication: Rizatriptan     Date of last refill: 04/27/2018 (#12/5)  [place]     Last office visit: 1/23/2019  Due back to clinic per last office note: 6 months    Date next office visit scheduled:    Future Appointments

## 2019-08-02 ENCOUNTER — OFFICE VISIT (OUTPATIENT)
Dept: NEUROLOGY | Facility: CLINIC | Age: 29
End: 2019-08-02
Payer: OTHER GOVERNMENT

## 2019-08-02 VITALS
SYSTOLIC BLOOD PRESSURE: 110 MMHG | BODY MASS INDEX: 31 KG/M2 | RESPIRATION RATE: 16 BRPM | DIASTOLIC BLOOD PRESSURE: 72 MMHG | WEIGHT: 215 LBS | HEART RATE: 60 BPM

## 2019-08-02 DIAGNOSIS — G43.119 INTRACTABLE MIGRAINE WITH AURA WITHOUT STATUS MIGRAINOSUS: Primary | ICD-10-CM

## 2019-08-02 PROCEDURE — 99213 OFFICE O/P EST LOW 20 MIN: CPT | Performed by: OTHER

## 2019-08-02 RX ORDER — TOPIRAMATE 50 MG/1
50 TABLET, FILM COATED ORAL 2 TIMES DAILY
Qty: 180 TABLET | Refills: 1 | Status: SHIPPED | OUTPATIENT
Start: 2019-08-02 | End: 2020-08-10

## 2019-08-02 NOTE — PROGRESS NOTES
[facility] Progress Note    HPI  Patient presents with:  Migraine      As per my initial H&P from 8/22/17:    Jenniffer Villasenor is a 31year old, who presents for evaluation of headaches.        Patient states he has history of migraines sin \"        Patient states he is doing well currently; he is back on Topamax 50 mg bid as he tried to reduce it down to 25 mg bid but had recurrence of migraines up to 7 times in 2 weeks.      He states his itching sensation has improved as well and is resolv (three) times daily with meals. (Patient taking differently: Take 50 mg by mouth 3 (three) times daily with meals. Only taken during gout flare up ), Disp: 60 capsule, Rfl: 0    Review of Systems:  No chest pain or palpitations; otherwise as noted in HPI. originally presented 8/2017, for evaluation and management. Neurologic exam remains normal and nonfocal and CT brain with no evidence for secondary intracranial pathology.    EEG has been done and was negative for epileptiform activity or subclinical

## 2019-08-02 NOTE — PROGRESS NOTES
Pt here for migraine follow up. Pt reports he is doing well. Tried to reduce topamax to 25mg BID, but migraines returned. Back to 50mg BID and migraines are well controlled. Only has a migraine once every 2-3 months, which responds to Rizatriptan.

## 2019-08-09 ENCOUNTER — OFFICE VISIT (OUTPATIENT)
Dept: FAMILY MEDICINE CLINIC | Facility: CLINIC | Age: 29
End: 2019-08-09
Payer: OTHER GOVERNMENT

## 2019-08-09 ENCOUNTER — TELEPHONE (OUTPATIENT)
Dept: FAMILY MEDICINE CLINIC | Facility: CLINIC | Age: 29
End: 2019-08-09

## 2019-08-09 VITALS
WEIGHT: 219 LBS | HEART RATE: 60 BPM | DIASTOLIC BLOOD PRESSURE: 70 MMHG | RESPIRATION RATE: 12 BRPM | SYSTOLIC BLOOD PRESSURE: 118 MMHG | TEMPERATURE: 98 F | BODY MASS INDEX: 31.71 KG/M2 | HEIGHT: 69.5 IN

## 2019-08-09 DIAGNOSIS — M25.562 ACUTE PAIN OF LEFT KNEE: Primary | ICD-10-CM

## 2019-08-09 PROCEDURE — 99213 OFFICE O/P EST LOW 20 MIN: CPT | Performed by: NURSE PRACTITIONER

## 2019-08-09 NOTE — PROGRESS NOTES
HPI:   Knee Pain    Pain location: Left Knee. This is a new problem. Episode onset: Wednesday. There has been no history of extremity trauma. The problem has been gradually worsening (especially since last night at 8pm).  The quality of the pain is descri stiffness. See HPI   Skin: Negative for itching. Neurological: Negative for tingling and numbness.         EXAM:   /70 (BP Location: Right arm, Patient Position: Sitting, Cuff Size: adult)   Pulse 60   Temp 98.4 °F (36.9 °C) (Temporal)   Resp

## 2019-08-09 NOTE — TELEPHONE ENCOUNTER
Patient is in [employer]. Has to go to training this weekend. Has \"PT\" test which includes a 2 mile run, 2 minutes of situps, and 2 minutes of push ups.

## 2019-08-09 NOTE — TELEPHONE ENCOUNTER
PATIENT HERE IN LOBBY     NEEDS DETAILED NOTE FOR WORK () TO RESTRICT PHYSICAL ACTIVITY FOR HOW EVER LONG YOU THINK WOULD BE BEST.         NEEDS TODAY PLEASE

## 2019-08-09 NOTE — TELEPHONE ENCOUNTER
I would not recommend physical activity for 1 week. Should follow up next Friday (in am when xray available) and we can reassess at that time.

## 2019-11-09 DIAGNOSIS — M10.071 IDIOPATHIC GOUT OF RIGHT ANKLE, UNSPECIFIED CHRONICITY: ICD-10-CM

## 2019-11-09 DIAGNOSIS — E79.0 HYPERURICEMIA: ICD-10-CM

## 2019-11-09 DIAGNOSIS — G43.119 INTRACTABLE MIGRAINE WITH AURA WITHOUT STATUS MIGRAINOSUS: ICD-10-CM

## 2019-11-09 RX ORDER — ALLOPURINOL 300 MG/1
TABLET ORAL
Qty: 90 TABLET | Refills: 4 | Status: SHIPPED | OUTPATIENT
Start: 2019-11-09 | End: 2019-11-29 | Stop reason: DRUGHIGH

## 2019-11-09 NOTE — TELEPHONE ENCOUNTER
Last refill #90 7/17  last office visit 8/9/19  No future appointments.   Patient is due for labs this month  Reminder letter sent

## 2019-11-11 RX ORDER — TOPIRAMATE 50 MG/1
TABLET, FILM COATED ORAL
Qty: 180 TABLET | Refills: 1 | Status: SHIPPED | OUTPATIENT
Start: 2019-11-11 | End: 2020-07-24

## 2019-11-11 NOTE — TELEPHONE ENCOUNTER
Medication: Topiramate 50 mg    Date of last [id number]/02/2019 with 1 addt refill      Last office visit: 8/2/2019  Due back to clinic per last office note:  RTN in 6 months  Date next office visit scheduled:      Last OV note recommendation: Per Dr. Coker Eth

## 2019-11-27 ENCOUNTER — APPOINTMENT (OUTPATIENT)
Dept: LAB | Age: 29
End: 2019-11-27
Attending: FAMILY MEDICINE
Payer: OTHER GOVERNMENT

## 2019-11-27 DIAGNOSIS — M10.071 IDIOPATHIC GOUT OF RIGHT ANKLE, UNSPECIFIED CHRONICITY: ICD-10-CM

## 2019-11-27 DIAGNOSIS — E79.0 HYPERURICEMIA: ICD-10-CM

## 2019-11-27 PROCEDURE — 36415 COLL VENOUS BLD VENIPUNCTURE: CPT | Performed by: FAMILY MEDICINE

## 2019-11-27 PROCEDURE — 80048 BASIC METABOLIC PNL TOTAL CA: CPT | Performed by: FAMILY MEDICINE

## 2019-11-27 PROCEDURE — 84550 ASSAY OF BLOOD/URIC ACID: CPT | Performed by: FAMILY MEDICINE

## 2019-11-29 DIAGNOSIS — E79.0 HYPERURICEMIA: ICD-10-CM

## 2019-11-29 DIAGNOSIS — M10.071 IDIOPATHIC GOUT OF RIGHT ANKLE, UNSPECIFIED CHRONICITY: Primary | ICD-10-CM

## 2019-11-29 RX ORDER — ALLOPURINOL 300 MG/1
150 TABLET ORAL DAILY
Qty: 1 TABLET | Refills: 0 | COMMUNITY
Start: 2019-11-29 | End: 2021-12-27

## 2020-03-20 ENCOUNTER — TELEPHONE (OUTPATIENT)
Dept: FAMILY MEDICINE CLINIC | Facility: CLINIC | Age: 30
End: 2020-03-20

## 2020-03-20 DIAGNOSIS — R05.9 COUGH: ICD-10-CM

## 2020-03-20 DIAGNOSIS — J01.90 ACUTE NON-RECURRENT SINUSITIS, UNSPECIFIED LOCATION: Primary | ICD-10-CM

## 2020-03-20 DIAGNOSIS — R09.82 PND (POST-NASAL DRIP): ICD-10-CM

## 2020-03-20 PROCEDURE — 99441 PHONE E/M BY PHYS 5-10 MIN: CPT | Performed by: NURSE PRACTITIONER

## 2020-03-20 RX ORDER — AZITHROMYCIN 250 MG/1
TABLET, FILM COATED ORAL
Qty: 6 TABLET | Refills: 0 | Status: SHIPPED | OUTPATIENT
Start: 2020-03-20 | End: 2020-08-10

## 2020-03-20 NOTE — TELEPHONE ENCOUNTER
Virtual/Telephone Check-In    Vasyl Cummings verbally consents to a Virtual/Telephone Check-In service on 03/20/20. Patient understands and accepts financial responsibility for any deductible, co-insurance and/or co-pays associated with this service.     Du

## 2020-03-20 NOTE — TELEPHONE ENCOUNTER
Runny nose, stuffy, cough productive since last Weds. Has been taking delsym and Mucinex. Uses [facility]    [facility]. Informed tele-medicine charge or phone call from nurse.

## 2020-07-20 ENCOUNTER — OFFICE VISIT (OUTPATIENT)
Dept: FAMILY MEDICINE CLINIC | Facility: CLINIC | Age: 30
End: 2020-07-20
Payer: OTHER GOVERNMENT

## 2020-07-20 VITALS
HEART RATE: 62 BPM | DIASTOLIC BLOOD PRESSURE: 80 MMHG | HEIGHT: 69.5 IN | WEIGHT: 227 LBS | BODY MASS INDEX: 32.87 KG/M2 | SYSTOLIC BLOOD PRESSURE: 128 MMHG | TEMPERATURE: 98 F

## 2020-07-20 DIAGNOSIS — M94.0 COSTOCHONDRITIS: Primary | ICD-10-CM

## 2020-07-20 PROCEDURE — 3079F DIAST BP 80-89 MM HG: CPT | Performed by: FAMILY MEDICINE

## 2020-07-20 PROCEDURE — 3008F BODY MASS INDEX DOCD: CPT | Performed by: FAMILY MEDICINE

## 2020-07-20 PROCEDURE — 99213 OFFICE O/P EST LOW 20 MIN: CPT | Performed by: FAMILY MEDICINE

## 2020-07-20 PROCEDURE — 3074F SYST BP LT 130 MM HG: CPT | Performed by: FAMILY MEDICINE

## 2020-07-20 RX ORDER — MELOXICAM 15 MG/1
15 TABLET ORAL DAILY
Qty: 14 TABLET | Refills: 0 | Status: SHIPPED | OUTPATIENT
Start: 2020-07-20 | End: 2020-08-03

## 2020-07-20 NOTE — PROGRESS NOTES
Kenisha Antony is a 26year old male.   Patient presents with:  Chest Pressure      Chief Complaint Reviewed and Verified  No Further Nursing Notes to Review    Tobacco Reviewed  Allergies Reviewed  Medications Reviewed  Problem   List Reviewed  Medical Hist kg)  08/09/19 : 219 lb (99.3 kg)  08/02/19 : 215 lb (97.5 kg)  07/17/19 : 212 lb 2 oz (96.2 kg)  05/06/19 : 211 lb (95.7 kg)  04/01/19 : 211 lb 8 oz (95.9 kg)      REVIEW OF SYSTEMS:   GENERAL HEALTH: feels well no complaints  SKIN: denies any unusual skin

## 2020-07-24 DIAGNOSIS — G43.119 INTRACTABLE MIGRAINE WITH AURA WITHOUT STATUS MIGRAINOSUS: ICD-10-CM

## 2020-07-24 RX ORDER — TOPIRAMATE 50 MG/1
50 TABLET, FILM COATED ORAL 2 TIMES DAILY
Qty: 180 TABLET | Refills: 1 | Status: SHIPPED | OUTPATIENT
Start: 2020-07-24 | End: 2020-08-10

## 2020-07-24 NOTE — TELEPHONE ENCOUNTER
Medication: topiramate 50 MG Oral Tab    Date of last refill: 8/2/2019 (#180/1)  [place]    Last office visit: 8/2/2019  Due back to clinic per last office note:  6 months  Date next office visit scheduled:    Future

## 2020-08-10 ENCOUNTER — OFFICE VISIT (OUTPATIENT)
Dept: NEUROLOGY | Facility: CLINIC | Age: 30
End: 2020-08-10
Payer: OTHER GOVERNMENT

## 2020-08-10 VITALS
HEART RATE: 64 BPM | DIASTOLIC BLOOD PRESSURE: 68 MMHG | TEMPERATURE: 98 F | SYSTOLIC BLOOD PRESSURE: 112 MMHG | RESPIRATION RATE: 18 BRPM

## 2020-08-10 DIAGNOSIS — G43.119 INTRACTABLE MIGRAINE WITH AURA WITHOUT STATUS MIGRAINOSUS: Primary | ICD-10-CM

## 2020-08-10 PROCEDURE — 3078F DIAST BP <80 MM HG: CPT | Performed by: OTHER

## 2020-08-10 PROCEDURE — 3074F SYST BP LT 130 MM HG: CPT | Performed by: OTHER

## 2020-08-10 PROCEDURE — 99213 OFFICE O/P EST LOW 20 MIN: CPT | Performed by: OTHER

## 2020-08-10 RX ORDER — TOPIRAMATE 50 MG/1
50 TABLET, FILM COATED ORAL 2 TIMES DAILY
Qty: 180 TABLET | Refills: 3 | Status: SHIPPED | OUTPATIENT
Start: 2020-08-10 | End: 2021-11-22

## 2020-08-10 NOTE — PROGRESS NOTES
[facility] Progress Note    HPI  Patient presents with:  Migraine      As per my initial H&P from 8/22/17:    Magnus Alvarado is a 31year old, who presents for evaluation of headaches.        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   Aura lasts 20-30 minutes and headache improved in ~1-2 hrs withou palpitations; otherwise as noted in HPI. Exam:  /68   Pulse 64   Temp 97.9 °F (36.6 °C)   Resp 18   Estimated body mass index is 33.04 kg/m² as calculated from the following:    Height as of 7/20/20: 69.5\".     Weight as of 7/20/20: 227 lb (103 kg at 50 mg bid, with Rizatriptan 10 mg PRN; he was not able to tolerate weaning Topamax as he had recurrence of severe migraine and prior pruritis previously improved with increased water intake - will continue same dose of 50 mg bid for prevention.      (M09

## 2020-11-24 ENCOUNTER — MED REC SCAN ONLY (OUTPATIENT)
Dept: FAMILY MEDICINE CLINIC | Facility: CLINIC | Age: 30
End: 2020-11-24

## 2020-11-24 ENCOUNTER — LAB ENCOUNTER (OUTPATIENT)
Dept: LAB | Age: 30
End: 2020-11-24
Attending: FAMILY MEDICINE
Payer: OTHER GOVERNMENT

## 2020-11-24 ENCOUNTER — OFFICE VISIT (OUTPATIENT)
Dept: FAMILY MEDICINE CLINIC | Facility: CLINIC | Age: 30
End: 2020-11-24
Payer: OTHER GOVERNMENT

## 2020-11-24 VITALS
DIASTOLIC BLOOD PRESSURE: 80 MMHG | WEIGHT: 234 LBS | BODY MASS INDEX: 33.88 KG/M2 | HEIGHT: 69.5 IN | HEART RATE: 64 BPM | TEMPERATURE: 98 F | SYSTOLIC BLOOD PRESSURE: 110 MMHG

## 2020-11-24 DIAGNOSIS — M10.071 IDIOPATHIC GOUT OF RIGHT ANKLE, UNSPECIFIED CHRONICITY: ICD-10-CM

## 2020-11-24 DIAGNOSIS — Z22.6 HTLV-1 CARRIER: ICD-10-CM

## 2020-11-24 DIAGNOSIS — Z00.00 ROUTINE HEALTH MAINTENANCE: ICD-10-CM

## 2020-11-24 DIAGNOSIS — E79.0 HYPERURICEMIA: ICD-10-CM

## 2020-11-24 DIAGNOSIS — Z00.00 ROUTINE HEALTH MAINTENANCE: Primary | ICD-10-CM

## 2020-11-24 PROCEDURE — 3079F DIAST BP 80-89 MM HG: CPT | Performed by: FAMILY MEDICINE

## 2020-11-24 PROCEDURE — 85025 COMPLETE CBC W/AUTO DIFF WBC: CPT | Performed by: FAMILY MEDICINE

## 2020-11-24 PROCEDURE — 3074F SYST BP LT 130 MM HG: CPT | Performed by: FAMILY MEDICINE

## 2020-11-24 PROCEDURE — 99395 PREV VISIT EST AGE 18-39: CPT | Performed by: FAMILY MEDICINE

## 2020-11-24 PROCEDURE — 36415 COLL VENOUS BLD VENIPUNCTURE: CPT | Performed by: FAMILY MEDICINE

## 2020-11-24 PROCEDURE — 84550 ASSAY OF BLOOD/URIC ACID: CPT | Performed by: FAMILY MEDICINE

## 2020-11-24 PROCEDURE — 83721 ASSAY OF BLOOD LIPOPROTEIN: CPT | Performed by: FAMILY MEDICINE

## 2020-11-24 PROCEDURE — 3008F BODY MASS INDEX DOCD: CPT | Performed by: FAMILY MEDICINE

## 2020-11-24 PROCEDURE — 86790 VIRUS ANTIBODY NOS: CPT | Performed by: FAMILY MEDICINE

## 2020-11-24 PROCEDURE — 80053 COMPREHEN METABOLIC PANEL: CPT | Performed by: FAMILY MEDICINE

## 2020-11-24 PROCEDURE — 80061 LIPID PANEL: CPT | Performed by: FAMILY MEDICINE

## 2020-11-24 NOTE — PROGRESS NOTES
Viji Woody is a 31year old male. Patient presents with:  Physical      HPI:     Physical.  Patient doing well. Recently gave blood and was found to test positive to screening for HT LV antibodies. Confirmatory test was inconclusive.   Patient without 85.6 kg (188 lb 10.2 oz)    GENERAL: well developed, well nourished,in no apparent distress  SKIN: no rashes,no suspicious lesions  HEENT: atraumatic, normocephalic, R TM normal, L TM normal, Pharynx normal  NECK: supple, no cervical adenopathy  LUNGS: akila

## 2020-11-25 DIAGNOSIS — E79.0 HYPERURICEMIA: Primary | ICD-10-CM

## 2021-06-08 ENCOUNTER — OFFICE VISIT (OUTPATIENT)
Dept: FAMILY MEDICINE CLINIC | Facility: CLINIC | Age: 31
End: 2021-06-08
Payer: OTHER GOVERNMENT

## 2021-06-08 VITALS — HEART RATE: 94 BPM | OXYGEN SATURATION: 97 % | TEMPERATURE: 98 F

## 2021-06-08 DIAGNOSIS — J01.90 ACUTE NON-RECURRENT SINUSITIS, UNSPECIFIED LOCATION: Primary | ICD-10-CM

## 2021-06-08 PROCEDURE — 99213 OFFICE O/P EST LOW 20 MIN: CPT | Performed by: FAMILY MEDICINE

## 2021-06-08 RX ORDER — AMOXICILLIN 875 MG/1
875 TABLET, COATED ORAL 2 TIMES DAILY
Qty: 20 TABLET | Refills: 0 | Status: SHIPPED | OUTPATIENT
Start: 2021-06-08 | End: 2021-12-27

## 2021-06-08 NOTE — PROGRESS NOTES
Carla Lake is a 31year old male. Patient presents with:  Cough: wet cough, mucus, nasal congestion, post nasal drip-started approx 6 days ago-fully vaccinated. HPI:   Patient started with nasal congestion that lasted about 1 week.   Then he develo apparent distress  SKIN: no rashes,no suspicious lesions  HEENT: atraumatic, normocephalic, R TM normal, L TM normal, Pharynx with PND  NECK: supple, no cervical adenopathy  LUNGS: clear to auscultation  CARDIO: RRR without murmur  ABD soft, nontender, nor

## 2021-09-28 DIAGNOSIS — G43.119 INTRACTABLE MIGRAINE WITH AURA WITHOUT STATUS MIGRAINOSUS: ICD-10-CM

## 2021-09-28 RX ORDER — RIZATRIPTAN BENZOATE 10 MG/1
10 TABLET ORAL AS NEEDED
Qty: 12 TABLET | Refills: 0 | Status: SHIPPED | OUTPATIENT
Start: 2021-09-28 | End: 2021-12-27

## 2021-09-28 RX ORDER — RIZATRIPTAN BENZOATE 10 MG/1
10 TABLET ORAL AS NEEDED
Qty: 12 TABLET | Refills: 0 | Status: CANCELLED | OUTPATIENT
Start: 2021-09-28

## 2021-09-28 RX ORDER — TOPIRAMATE 50 MG/1
50 TABLET, FILM COATED ORAL 2 TIMES DAILY
Qty: 60 TABLET | Refills: 0 | Status: CANCELLED | OUTPATIENT
Start: 2021-09-28

## 2021-09-28 NOTE — TELEPHONE ENCOUNTER
Pt is out. He needs medication ASAP   He know he needs to make an appointment. He will call later to make one.

## 2021-09-28 NOTE — TELEPHONE ENCOUNTER
Medication: Rizatriptan 10 mg    Date of last refill: 07/17/2019 with 5 ddt refills  [place]     Last office visit:08/10/20  Due back to clinic per last office note:  RTN in   Date next office visit scheduled:  No future

## 2021-10-25 ENCOUNTER — TELEPHONE (OUTPATIENT)
Dept: FAMILY MEDICINE CLINIC | Facility: CLINIC | Age: 31
End: 2021-10-25

## 2021-10-25 NOTE — TELEPHONE ENCOUNTER
Pt. Scheduled appt. Through My chart for:   Gastrointestinal issues after eating bread. Is this ok to be seen in regular office?

## 2021-10-27 ENCOUNTER — OFFICE VISIT (OUTPATIENT)
Dept: FAMILY MEDICINE CLINIC | Facility: CLINIC | Age: 31
End: 2021-10-27
Payer: OTHER GOVERNMENT

## 2021-10-27 VITALS
HEIGHT: 69.5 IN | BODY MASS INDEX: 32.9 KG/M2 | RESPIRATION RATE: 16 BRPM | WEIGHT: 227.25 LBS | OXYGEN SATURATION: 99 % | HEART RATE: 88 BPM | TEMPERATURE: 98 F | DIASTOLIC BLOOD PRESSURE: 78 MMHG | SYSTOLIC BLOOD PRESSURE: 116 MMHG

## 2021-10-27 DIAGNOSIS — R10.9 ABDOMINAL CRAMPING: Primary | ICD-10-CM

## 2021-10-27 DIAGNOSIS — E66.09 CLASS 1 OBESITY DUE TO EXCESS CALORIES WITHOUT SERIOUS COMORBIDITY WITH BODY MASS INDEX (BMI) OF 33.0 TO 33.9 IN ADULT: ICD-10-CM

## 2021-10-27 PROCEDURE — 3008F BODY MASS INDEX DOCD: CPT | Performed by: FAMILY MEDICINE

## 2021-10-27 PROCEDURE — 99214 OFFICE O/P EST MOD 30 MIN: CPT | Performed by: FAMILY MEDICINE

## 2021-10-27 PROCEDURE — 3078F DIAST BP <80 MM HG: CPT | Performed by: FAMILY MEDICINE

## 2021-10-27 PROCEDURE — 3074F SYST BP LT 130 MM HG: CPT | Performed by: FAMILY MEDICINE

## 2021-10-27 NOTE — PATIENT INSTRUCTIONS
Discussed diet, exercise, weight loss. Low-carb, sugar eating. Persistent or recurrent symptoms patient to call. Call with questions or problems. Reassurance given. Continue with working with weight loss.

## 2021-10-27 NOTE — PROGRESS NOTES
Subjective:   Patient ID: Federica Balderrama is a 27year old male. Pt 2 episodes of abd cramps / diarrhea after bread over the last 2 wks  W/o other problems  Solids associated with heavy meals. Rich foods. Trying to diet, lose weight.   Without other proble

## 2021-11-20 DIAGNOSIS — G43.119 INTRACTABLE MIGRAINE WITH AURA WITHOUT STATUS MIGRAINOSUS: ICD-10-CM

## 2021-11-22 RX ORDER — TOPIRAMATE 50 MG/1
TABLET, FILM COATED ORAL
Qty: 60 TABLET | Refills: 0 | Status: SHIPPED | OUTPATIENT
Start: 2021-11-22 | End: 2021-12-23

## 2021-11-22 NOTE — TELEPHONE ENCOUNTER
Patient called back, per schedule needed to be put on for late December.  Currently scheduled for 12/27/21

## 2021-11-22 NOTE — TELEPHONE ENCOUNTER
Medication: Topiramate 50 mg     Date of last refill: 08/10/21 with 3 addt refills  [place]      Last office visit:08/10/20  Due back to clinic per last office note:  RTN in   Date next office visit scheduled:  No future

## 2021-12-23 DIAGNOSIS — G43.119 INTRACTABLE MIGRAINE WITH AURA WITHOUT STATUS MIGRAINOSUS: ICD-10-CM

## 2021-12-23 RX ORDER — TOPIRAMATE 50 MG/1
50 TABLET, FILM COATED ORAL 2 TIMES DAILY
Qty: 60 TABLET | Refills: 2 | Status: SHIPPED | OUTPATIENT
Start: 2021-12-23

## 2021-12-23 NOTE — TELEPHONE ENCOUNTER
Medication: Topiramate     Date of last refill: 11/22/2021 (#60/0)  [place]     Last office visit: 8/10/2020  Due back to clinic per last office note:  RTC in one year  Date next office visit sched

## 2021-12-27 ENCOUNTER — OFFICE VISIT (OUTPATIENT)
Dept: NEUROLOGY | Facility: CLINIC | Age: 31
End: 2021-12-27
Payer: OTHER GOVERNMENT

## 2021-12-27 VITALS
HEIGHT: 69.5 IN | SYSTOLIC BLOOD PRESSURE: 113 MMHG | DIASTOLIC BLOOD PRESSURE: 74 MMHG | BODY MASS INDEX: 32.87 KG/M2 | WEIGHT: 227 LBS | HEART RATE: 83 BPM | RESPIRATION RATE: 16 BRPM

## 2021-12-27 DIAGNOSIS — G43.119 INTRACTABLE MIGRAINE WITH AURA WITHOUT STATUS MIGRAINOSUS: ICD-10-CM

## 2021-12-27 PROCEDURE — 3008F BODY MASS INDEX DOCD: CPT | Performed by: OTHER

## 2021-12-27 PROCEDURE — 99213 OFFICE O/P EST LOW 20 MIN: CPT | Performed by: OTHER

## 2021-12-27 PROCEDURE — 3074F SYST BP LT 130 MM HG: CPT | Performed by: OTHER

## 2021-12-27 PROCEDURE — 3078F DIAST BP <80 MM HG: CPT | Performed by: OTHER

## 2021-12-27 RX ORDER — RIZATRIPTAN BENZOATE 10 MG/1
10 TABLET ORAL AS NEEDED
Qty: 12 TABLET | Refills: 5 | Status: SHIPPED | OUTPATIENT
Start: 2021-12-27

## 2021-12-27 NOTE — PROGRESS NOTES
[facility] Progress Note    HPI  Patient presents with:  Migraine: Follow up         As per my initial H&P from 8/22/17:    Janis Herring is a 31year old, who presents for evaluation of headaches.        Patient states he has history of or mood issues. \"      Patient last seen 8/2020. 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   He states he had impro topiramate 50 MG Oral Tab, Take 1 tablet (50 mg total) by mouth 2 (two) times daily. , Disp: 60 tablet, Rfl: 2    Review of Systems:  No chest pain or palpitations; otherwise as noted in HPI.     Exam:  /74 (BP Location: Left arm, Patient Position: Sit nonfocal and CT brain with no evidence for secondary intracranial pathology. EEG has been done and was negative for epileptiform activity or subclinical seizures, as well.      Migraines are infrequent and well controled overall on Topamax at 50 mg bid, w

## 2022-01-25 DIAGNOSIS — G43.119 INTRACTABLE MIGRAINE WITH AURA WITHOUT STATUS MIGRAINOSUS: ICD-10-CM

## 2022-01-26 RX ORDER — TOPIRAMATE 50 MG/1
TABLET, FILM COATED ORAL
Qty: 180 TABLET | Refills: 0 | OUTPATIENT
Start: 2022-01-26

## 2022-01-26 NOTE — TELEPHONE ENCOUNTER
Topiramate 50mg refilled on 12/23/21, #60 w/ 2R  Pt not due for refill    RN, please refuse as refill not due until ~3/2022

## 2022-03-28 RX ORDER — TOPIRAMATE 50 MG/1
50 TABLET, FILM COATED ORAL 2 TIMES DAILY
Qty: 60 TABLET | Refills: 2 | Status: SHIPPED | OUTPATIENT
Start: 2022-03-28

## 2022-06-06 ENCOUNTER — OFFICE VISIT (OUTPATIENT)
Dept: FAMILY MEDICINE CLINIC | Facility: CLINIC | Age: 32
End: 2022-06-06
Payer: OTHER GOVERNMENT

## 2022-06-06 VITALS
TEMPERATURE: 97 F | RESPIRATION RATE: 16 BRPM | OXYGEN SATURATION: 99 % | DIASTOLIC BLOOD PRESSURE: 81 MMHG | HEART RATE: 68 BPM | WEIGHT: 225 LBS | SYSTOLIC BLOOD PRESSURE: 124 MMHG | HEIGHT: 69 IN | BODY MASS INDEX: 33.33 KG/M2

## 2022-06-06 DIAGNOSIS — J02.9 SORE THROAT: Primary | ICD-10-CM

## 2022-06-06 LAB — CONTROL LINE PRESENT WITH A CLEAR BACKGROUND (YES/NO): YES YES/NO

## 2022-06-07 LAB — SARS-COV-2 RNA RESP QL NAA+PROBE: NOT DETECTED

## 2022-06-23 ENCOUNTER — OFFICE VISIT (OUTPATIENT)
Dept: FAMILY MEDICINE CLINIC | Facility: CLINIC | Age: 32
End: 2022-06-23
Payer: OTHER GOVERNMENT

## 2022-06-23 VITALS
TEMPERATURE: 98 F | BODY MASS INDEX: 34.51 KG/M2 | OXYGEN SATURATION: 98 % | HEART RATE: 74 BPM | HEIGHT: 69 IN | WEIGHT: 233 LBS | SYSTOLIC BLOOD PRESSURE: 118 MMHG | DIASTOLIC BLOOD PRESSURE: 62 MMHG

## 2022-06-23 DIAGNOSIS — B35.3 TINEA PEDIS OF BOTH FEET: Primary | ICD-10-CM

## 2022-06-23 PROCEDURE — 3008F BODY MASS INDEX DOCD: CPT | Performed by: FAMILY MEDICINE

## 2022-06-23 PROCEDURE — 3078F DIAST BP <80 MM HG: CPT | Performed by: FAMILY MEDICINE

## 2022-06-23 PROCEDURE — 3074F SYST BP LT 130 MM HG: CPT | Performed by: FAMILY MEDICINE

## 2022-06-23 PROCEDURE — 99213 OFFICE O/P EST LOW 20 MIN: CPT | Performed by: FAMILY MEDICINE

## 2022-06-23 RX ORDER — TERBINAFINE HYDROCHLORIDE 250 MG/1
250 TABLET ORAL DAILY
Qty: 30 TABLET | Refills: 0 | Status: SHIPPED | OUTPATIENT
Start: 2022-06-23 | End: 2022-09-15

## 2022-06-30 DIAGNOSIS — G43.119 INTRACTABLE MIGRAINE WITH AURA WITHOUT STATUS MIGRAINOSUS: ICD-10-CM

## 2022-06-30 RX ORDER — TOPIRAMATE 50 MG/1
TABLET, FILM COATED ORAL
Qty: 60 TABLET | Refills: 5 | Status: SHIPPED | OUTPATIENT
Start: 2022-06-30

## 2022-11-19 ENCOUNTER — OFFICE VISIT (OUTPATIENT)
Dept: FAMILY MEDICINE CLINIC | Facility: CLINIC | Age: 32
End: 2022-11-19
Payer: OTHER GOVERNMENT

## 2022-11-19 VITALS
TEMPERATURE: 98 F | DIASTOLIC BLOOD PRESSURE: 70 MMHG | OXYGEN SATURATION: 99 % | RESPIRATION RATE: 18 BRPM | BODY MASS INDEX: 33.33 KG/M2 | HEIGHT: 69 IN | HEART RATE: 61 BPM | WEIGHT: 225 LBS | SYSTOLIC BLOOD PRESSURE: 112 MMHG

## 2022-11-19 DIAGNOSIS — J06.9 VIRAL URI WITH COUGH: Primary | ICD-10-CM

## 2022-11-19 PROCEDURE — 3008F BODY MASS INDEX DOCD: CPT | Performed by: PHYSICIAN ASSISTANT

## 2022-11-19 PROCEDURE — 99213 OFFICE O/P EST LOW 20 MIN: CPT | Performed by: PHYSICIAN ASSISTANT

## 2022-11-19 PROCEDURE — 3078F DIAST BP <80 MM HG: CPT | Performed by: PHYSICIAN ASSISTANT

## 2022-11-19 PROCEDURE — 3074F SYST BP LT 130 MM HG: CPT | Performed by: PHYSICIAN ASSISTANT

## 2022-11-19 RX ORDER — FLUTICASONE PROPIONATE 50 MCG
2 SPRAY, SUSPENSION (ML) NASAL DAILY
Qty: 1 EACH | Refills: 0 | Status: SHIPPED | OUTPATIENT
Start: 2022-11-19 | End: 2023-11-14

## 2022-11-19 NOTE — PATIENT INSTRUCTIONS
Flonase:  2 sprays in each nostril daily, or 1 spray in each nostril twice daily. If you develop a nosebleed, stop medication and restart at half the dose (1 spray in each nostril daily) after you have not had a nosebleed for 2 days. If nosebleed recurs, then stop medication completely. May alternate with Afrin (or other nasal decongestant). 2.  Encourage fluids, humidifier/vaporizor at bedside, elevate head of bed (sleep with extra pillow), vapor rub to chest, steam therapy if no fever, warm compresses for sinus pressure if no fever, salt water gargles for sore throat, lozenges for sore throat, may try over the counter saline nasal spray or irrigation kit (use distilled water with irrigation kit) for sinus pressure/congestion, get plenty of rest.          3.  If cough is productive, recommend over the counter Mucinex to help thin secretions and encourage productive cough to clear chest congestion. If cough becomes dry or non-productive, then stop Mucinex and start over-the-counter Delsym to suppress or stop cough.

## 2022-12-11 ENCOUNTER — APPOINTMENT (OUTPATIENT)
Dept: GENERAL RADIOLOGY | Age: 32
End: 2022-12-11
Attending: PHYSICIAN ASSISTANT
Payer: OTHER GOVERNMENT

## 2022-12-11 ENCOUNTER — HOSPITAL ENCOUNTER (OUTPATIENT)
Age: 32
Discharge: HOME | End: 2022-12-11
Payer: OTHER GOVERNMENT

## 2022-12-11 VITALS
RESPIRATION RATE: 18 BRPM | HEART RATE: 65 BPM | OXYGEN SATURATION: 98 % | DIASTOLIC BLOOD PRESSURE: 77 MMHG | TEMPERATURE: 97 F | SYSTOLIC BLOOD PRESSURE: 108 MMHG

## 2022-12-11 DIAGNOSIS — S82.892A CLOSED FRACTURE OF MALLEOLUS OF LEFT ANKLE, INITIAL ENCOUNTER: Primary | ICD-10-CM

## 2022-12-11 PROCEDURE — 99213 OFFICE O/P EST LOW 20 MIN: CPT | Performed by: PHYSICIAN ASSISTANT

## 2022-12-11 PROCEDURE — E0114 CRUTCH UNDERARM PAIR NO WOOD: HCPCS | Performed by: PHYSICIAN ASSISTANT

## 2022-12-11 PROCEDURE — 73610 X-RAY EXAM OF ANKLE: CPT | Performed by: PHYSICIAN ASSISTANT

## 2022-12-11 PROCEDURE — 29515 APPLICATION SHORT LEG SPLINT: CPT | Performed by: PHYSICIAN ASSISTANT

## 2022-12-12 ENCOUNTER — TELEPHONE (OUTPATIENT)
Dept: ORTHOPEDICS CLINIC | Facility: CLINIC | Age: 32
End: 2022-12-12

## 2022-12-13 ENCOUNTER — OFFICE VISIT (OUTPATIENT)
Dept: ORTHOPEDICS CLINIC | Facility: CLINIC | Age: 32
End: 2022-12-13
Payer: OTHER GOVERNMENT

## 2022-12-13 ENCOUNTER — PATIENT MESSAGE (OUTPATIENT)
Dept: ORTHOPEDICS CLINIC | Facility: CLINIC | Age: 32
End: 2022-12-13

## 2022-12-13 VITALS — WEIGHT: 225 LBS | HEIGHT: 69 IN | BODY MASS INDEX: 33.33 KG/M2

## 2022-12-13 DIAGNOSIS — S82.892A CLOSED AVULSION FRACTURE OF LEFT ANKLE, INITIAL ENCOUNTER: Primary | ICD-10-CM

## 2022-12-13 PROCEDURE — 3008F BODY MASS INDEX DOCD: CPT | Performed by: PHYSICIAN ASSISTANT

## 2022-12-13 PROCEDURE — 99213 OFFICE O/P EST LOW 20 MIN: CPT | Performed by: PHYSICIAN ASSISTANT

## 2022-12-13 NOTE — TELEPHONE ENCOUNTER
From: Tavia Gleason  To: Sebastian Alonzo  Sent: 12/13/2022 4:10 PM CST  Subject: Walking Boot Question    Good Afternoon,     After I got home I had a few other questions I thought of. Should I remove the boot for showering, sleeping, etc? I know you mentioned icing 20 min 3 times a day, should the boot be removed for that as well?      Thank you for your time,   Vanessa Jarvis

## 2023-01-04 DIAGNOSIS — G43.119 INTRACTABLE MIGRAINE WITH AURA WITHOUT STATUS MIGRAINOSUS: ICD-10-CM

## 2023-01-04 RX ORDER — TOPIRAMATE 50 MG/1
TABLET, FILM COATED ORAL
Qty: 60 TABLET | Refills: 5 | Status: CANCELLED | OUTPATIENT
Start: 2023-01-04

## 2023-01-05 DIAGNOSIS — G43.119 INTRACTABLE MIGRAINE WITH AURA WITHOUT STATUS MIGRAINOSUS: ICD-10-CM

## 2023-01-05 RX ORDER — TOPIRAMATE 50 MG/1
TABLET, FILM COATED ORAL
Qty: 60 TABLET | Refills: 0 | Status: SHIPPED | OUTPATIENT
Start: 2023-01-05

## 2023-01-05 NOTE — TELEPHONE ENCOUNTER
Topiramate 50mg refilled 1/5/23, #60/0R to requested pharmacy    RN, please refuse as this is a duplicate request. #48 day not appropriate, pt due for appt

## 2023-01-09 RX ORDER — TOPIRAMATE 50 MG/1
TABLET, FILM COATED ORAL
Qty: 180 TABLET | Refills: 0 | OUTPATIENT
Start: 2023-01-09

## 2023-01-10 ENCOUNTER — OFFICE VISIT (OUTPATIENT)
Dept: ORTHOPEDICS CLINIC | Facility: CLINIC | Age: 33
End: 2023-01-10
Payer: OTHER GOVERNMENT

## 2023-01-10 DIAGNOSIS — S82.892D CLOSED AVULSION FRACTURE OF LEFT ANKLE WITH ROUTINE HEALING, SUBSEQUENT ENCOUNTER: Primary | ICD-10-CM

## 2023-01-10 DIAGNOSIS — S93.409A SPRAIN OF LATERAL LIGAMENT OF ANKLE JOINT: ICD-10-CM

## 2023-01-10 PROCEDURE — 99213 OFFICE O/P EST LOW 20 MIN: CPT | Performed by: PHYSICIAN ASSISTANT

## 2023-02-20 DIAGNOSIS — G43.119 INTRACTABLE MIGRAINE WITH AURA WITHOUT STATUS MIGRAINOSUS: ICD-10-CM

## 2023-02-20 RX ORDER — TOPIRAMATE 50 MG/1
50 TABLET, FILM COATED ORAL 2 TIMES DAILY
Qty: 60 TABLET | Refills: 0 | Status: CANCELLED | OUTPATIENT
Start: 2023-02-20

## 2023-02-21 DIAGNOSIS — G43.119 INTRACTABLE MIGRAINE WITH AURA WITHOUT STATUS MIGRAINOSUS: ICD-10-CM

## 2023-02-22 RX ORDER — TOPIRAMATE 50 MG/1
TABLET, FILM COATED ORAL
Qty: 180 TABLET | Refills: 0 | OUTPATIENT
Start: 2023-02-22

## 2023-02-23 RX ORDER — TOPIRAMATE 50 MG/1
TABLET, FILM COATED ORAL
Qty: 60 TABLET | Refills: 0 | OUTPATIENT
Start: 2023-02-23

## 2023-04-05 ENCOUNTER — OFFICE VISIT (OUTPATIENT)
Dept: NEUROLOGY | Facility: CLINIC | Age: 33
End: 2023-04-05
Payer: OTHER GOVERNMENT

## 2023-04-05 VITALS
BODY MASS INDEX: 33.33 KG/M2 | HEART RATE: 96 BPM | SYSTOLIC BLOOD PRESSURE: 98 MMHG | HEIGHT: 69 IN | DIASTOLIC BLOOD PRESSURE: 68 MMHG | WEIGHT: 225 LBS | RESPIRATION RATE: 16 BRPM

## 2023-04-05 DIAGNOSIS — G43.119 INTRACTABLE MIGRAINE WITH AURA WITHOUT STATUS MIGRAINOSUS: Primary | ICD-10-CM

## 2023-04-05 PROCEDURE — 99213 OFFICE O/P EST LOW 20 MIN: CPT | Performed by: OTHER

## 2023-04-05 PROCEDURE — 3008F BODY MASS INDEX DOCD: CPT | Performed by: OTHER

## 2023-04-05 PROCEDURE — 3078F DIAST BP <80 MM HG: CPT | Performed by: OTHER

## 2023-04-05 PROCEDURE — 3074F SYST BP LT 130 MM HG: CPT | Performed by: OTHER

## 2023-04-05 RX ORDER — TOPIRAMATE 50 MG/1
50 TABLET, FILM COATED ORAL 2 TIMES DAILY
Qty: 180 TABLET | Refills: 3 | Status: SHIPPED | OUTPATIENT
Start: 2023-04-05

## 2023-06-01 ENCOUNTER — OFFICE VISIT (OUTPATIENT)
Dept: FAMILY MEDICINE CLINIC | Facility: CLINIC | Age: 33
End: 2023-06-01
Payer: OTHER GOVERNMENT

## 2023-06-01 VITALS
HEIGHT: 69 IN | HEART RATE: 60 BPM | BODY MASS INDEX: 30.07 KG/M2 | SYSTOLIC BLOOD PRESSURE: 110 MMHG | WEIGHT: 203 LBS | DIASTOLIC BLOOD PRESSURE: 70 MMHG | TEMPERATURE: 98 F | OXYGEN SATURATION: 99 %

## 2023-06-01 DIAGNOSIS — M25.512 ACUTE PAIN OF LEFT SHOULDER: ICD-10-CM

## 2023-06-01 DIAGNOSIS — S46.812A SUPRASPINATUS SPRAIN, LEFT, INITIAL ENCOUNTER: Primary | ICD-10-CM

## 2023-06-01 PROCEDURE — 99214 OFFICE O/P EST MOD 30 MIN: CPT | Performed by: FAMILY MEDICINE

## 2023-06-01 PROCEDURE — 3074F SYST BP LT 130 MM HG: CPT | Performed by: FAMILY MEDICINE

## 2023-06-01 PROCEDURE — 3008F BODY MASS INDEX DOCD: CPT | Performed by: FAMILY MEDICINE

## 2023-06-01 PROCEDURE — 3078F DIAST BP <80 MM HG: CPT | Performed by: FAMILY MEDICINE

## 2023-06-01 PROCEDURE — 98925 OSTEOPATH MANJ 1-2 REGIONS: CPT | Performed by: FAMILY MEDICINE

## 2023-06-07 NOTE — PROGRESS NOTES
HPI:    Patient ID: Juanito Ma is a 33year old male. Pt seen ER  Acute gout attack last wk  W/o trauma  Hx freq attacks pt believes - R ankle    HPI    Review of Systems   Constitutional: Negative for chills and fever. HENT: Negative.     Respiratory SpO2 99%   BMI 30.79 kg/m²            ASSESSMENT/PLAN:   Idiopathic gout of right ankle, unspecified chronicity  (primary encounter diagnosis)  Hyperuricemia    No orders of the defined types were placed in this encounter.       Meds This Visit:  Requested [place] Pregnancy And Lactation Text: There is insufficient data to evaluate drug-associated risk for major birth defects, miscarriage, or other adverse maternal or fetal outcomes.  There is a pregnancy registry that monitors pregnancy outcomes in pregnant persons exposed to the medication during pregnancy.  It is unknown if this medication is excreted in breast milk.  Do not breastfeed during treatment and for about 4 weeks after the last dose.

## 2023-11-05 ENCOUNTER — HOSPITAL ENCOUNTER (OUTPATIENT)
Age: 33
Discharge: HOME | End: 2023-11-05
Payer: OTHER GOVERNMENT

## 2023-11-05 VITALS
TEMPERATURE: 98 F | WEIGHT: 210 LBS | HEART RATE: 57 BPM | OXYGEN SATURATION: 99 % | HEIGHT: 70 IN | RESPIRATION RATE: 18 BRPM | BODY MASS INDEX: 30.06 KG/M2 | DIASTOLIC BLOOD PRESSURE: 75 MMHG | SYSTOLIC BLOOD PRESSURE: 111 MMHG

## 2023-11-05 DIAGNOSIS — J06.9 VIRAL URI WITH COUGH: Primary | ICD-10-CM

## 2023-11-05 RX ORDER — BENZONATATE 100 MG/1
100 CAPSULE ORAL 3 TIMES DAILY PRN
Qty: 15 CAPSULE | Refills: 0 | Status: SHIPPED | OUTPATIENT
Start: 2023-11-05

## 2023-11-05 NOTE — ED INITIAL ASSESSMENT (HPI)
Pt with c/o dry persistent cough x 1 week.    Pt has tried delsym and cough drops without improvement

## 2023-11-07 ENCOUNTER — OFFICE VISIT (OUTPATIENT)
Dept: FAMILY MEDICINE CLINIC | Facility: CLINIC | Age: 33
End: 2023-11-07
Payer: OTHER GOVERNMENT

## 2023-11-07 VITALS
HEIGHT: 70 IN | SYSTOLIC BLOOD PRESSURE: 120 MMHG | DIASTOLIC BLOOD PRESSURE: 70 MMHG | OXYGEN SATURATION: 99 % | HEART RATE: 72 BPM | TEMPERATURE: 98 F | BODY MASS INDEX: 30.06 KG/M2 | WEIGHT: 210 LBS

## 2023-11-07 DIAGNOSIS — L30.9 ECZEMA, UNSPECIFIED TYPE: Primary | ICD-10-CM

## 2023-11-07 PROCEDURE — 3008F BODY MASS INDEX DOCD: CPT | Performed by: FAMILY MEDICINE

## 2023-11-07 PROCEDURE — 3078F DIAST BP <80 MM HG: CPT | Performed by: FAMILY MEDICINE

## 2023-11-07 PROCEDURE — 3074F SYST BP LT 130 MM HG: CPT | Performed by: FAMILY MEDICINE

## 2023-11-07 PROCEDURE — 99213 OFFICE O/P EST LOW 20 MIN: CPT | Performed by: FAMILY MEDICINE

## 2023-11-07 RX ORDER — CLOBETASOL PROPIONATE 0.5 MG/G
CREAM TOPICAL
Qty: 45 G | Refills: 1 | Status: SHIPPED | OUTPATIENT
Start: 2023-11-07

## 2023-11-12 ENCOUNTER — APPOINTMENT (OUTPATIENT)
Dept: GENERAL RADIOLOGY | Age: 33
End: 2023-11-12
Attending: NURSE PRACTITIONER
Payer: OTHER GOVERNMENT

## 2023-11-12 ENCOUNTER — HOSPITAL ENCOUNTER (OUTPATIENT)
Age: 33
Discharge: HOME | End: 2023-11-12
Payer: OTHER GOVERNMENT

## 2023-11-12 VITALS
HEART RATE: 67 BPM | RESPIRATION RATE: 18 BRPM | BODY MASS INDEX: 30.06 KG/M2 | HEIGHT: 70 IN | DIASTOLIC BLOOD PRESSURE: 86 MMHG | OXYGEN SATURATION: 100 % | SYSTOLIC BLOOD PRESSURE: 128 MMHG | WEIGHT: 210 LBS | TEMPERATURE: 97 F

## 2023-11-12 DIAGNOSIS — R05.1 ACUTE COUGH: Primary | ICD-10-CM

## 2023-11-12 DIAGNOSIS — J06.9 VIRAL UPPER RESPIRATORY TRACT INFECTION: ICD-10-CM

## 2023-11-12 PROCEDURE — 71046 X-RAY EXAM CHEST 2 VIEWS: CPT | Performed by: NURSE PRACTITIONER

## 2023-11-12 PROCEDURE — 99204 OFFICE O/P NEW MOD 45 MIN: CPT | Performed by: NURSE PRACTITIONER

## 2023-11-12 RX ORDER — PREDNISONE 20 MG/1
20 TABLET ORAL DAILY
Qty: 5 TABLET | Refills: 0 | Status: SHIPPED | OUTPATIENT
Start: 2023-11-12 | End: 2023-11-17

## 2023-11-12 RX ORDER — ALBUTEROL SULFATE 90 UG/1
2 AEROSOL, METERED RESPIRATORY (INHALATION) EVERY 4 HOURS PRN
Qty: 1 EACH | Refills: 0 | Status: SHIPPED | OUTPATIENT
Start: 2023-11-12 | End: 2023-12-12

## 2023-12-27 ENCOUNTER — OFFICE VISIT (OUTPATIENT)
Dept: FAMILY MEDICINE CLINIC | Facility: CLINIC | Age: 33
End: 2023-12-27
Payer: OTHER GOVERNMENT

## 2023-12-27 VITALS
TEMPERATURE: 98 F | BODY MASS INDEX: 29.35 KG/M2 | SYSTOLIC BLOOD PRESSURE: 110 MMHG | OXYGEN SATURATION: 97 % | HEART RATE: 73 BPM | RESPIRATION RATE: 18 BRPM | WEIGHT: 205 LBS | DIASTOLIC BLOOD PRESSURE: 72 MMHG | HEIGHT: 70 IN

## 2023-12-27 DIAGNOSIS — J34.89 SINUS PRESSURE: Primary | ICD-10-CM

## 2023-12-27 DIAGNOSIS — J06.9 UPPER RESPIRATORY TRACT INFECTION, UNSPECIFIED TYPE: ICD-10-CM

## 2023-12-27 LAB
OPERATOR ID: NORMAL
POCT LOT NUMBER: NORMAL
RAPID SARS-COV-2 BY PCR: NOT DETECTED

## 2023-12-27 PROCEDURE — 3078F DIAST BP <80 MM HG: CPT | Performed by: NURSE PRACTITIONER

## 2023-12-27 PROCEDURE — U0002 COVID-19 LAB TEST NON-CDC: HCPCS | Performed by: NURSE PRACTITIONER

## 2023-12-27 PROCEDURE — 99213 OFFICE O/P EST LOW 20 MIN: CPT | Performed by: NURSE PRACTITIONER

## 2023-12-27 PROCEDURE — 3008F BODY MASS INDEX DOCD: CPT | Performed by: NURSE PRACTITIONER

## 2023-12-27 PROCEDURE — 3074F SYST BP LT 130 MM HG: CPT | Performed by: NURSE PRACTITIONER

## 2024-04-28 ENCOUNTER — HOSPITAL ENCOUNTER (OUTPATIENT)
Age: 34
Discharge: HOME | End: 2024-04-28
Payer: OTHER GOVERNMENT

## 2024-04-28 VITALS
DIASTOLIC BLOOD PRESSURE: 75 MMHG | OXYGEN SATURATION: 99 % | TEMPERATURE: 98 F | RESPIRATION RATE: 18 BRPM | SYSTOLIC BLOOD PRESSURE: 112 MMHG | HEART RATE: 87 BPM

## 2024-04-28 DIAGNOSIS — S39.012A STRAIN OF LUMBAR REGION, INITIAL ENCOUNTER: Primary | ICD-10-CM

## 2024-04-28 PROCEDURE — 99214 OFFICE O/P EST MOD 30 MIN: CPT | Performed by: NURSE PRACTITIONER

## 2024-04-28 RX ORDER — ORPHENADRINE CITRATE 100 MG/1
100 TABLET, EXTENDED RELEASE ORAL 2 TIMES DAILY
Qty: 14 EACH | Refills: 0 | Status: SHIPPED | OUTPATIENT
Start: 2024-04-28 | End: 2024-05-05

## 2024-04-28 RX ORDER — PREDNISONE 20 MG/1
TABLET ORAL
Qty: 21 TABLET | Refills: 0 | Status: SHIPPED | OUTPATIENT
Start: 2024-04-28 | End: 2024-05-10

## 2024-04-28 NOTE — ED INITIAL ASSESSMENT (HPI)
Patient bent over on Wednesday and felt something pop in his back. He has had lumbar-sacral pain since with pain through his hips. No loss of bowel or bladder control, no issues with balance, no numbness or tingling.

## 2024-04-28 NOTE — DISCHARGE INSTRUCTIONS
Follow-up with your primary care provider for all of your healthcare needs  Follow-up with Dr. Garcia or Petra Tipton for further evaluation of your back issues  Take Norflex as directed by the pharmacy  Take the steroids as directed  Ice to the back ice 20 minutes on every couple hours  Over-the-counter lidocaine patch Biofreeze or IcyHot on the area can be helpful  Return to the emergency room if any worse symptoms or concerns

## 2024-04-28 NOTE — ED PROVIDER NOTES
Patient Seen in: [facility] [place]      History     Chief Complaint   Patient presents with    Back Pain     Stated Complaint: lower back pain    Subjective:   HPI  33-year-old male presents to the immediate care with complaints of lower back pain since Wednesday.  States he bent over and felt a pop to his lower back.  Denies any numbness or tingling, denies any loss of bowel or or urine control/retention.  Denies any saddle anesthesia.  Patient has no other issues with concerns.  The patient's medication list, past medical history and social history elements as listed in today's nurse's notes were reviewed and agreed (except as otherwise stated in the HPI).  The patient's family history reviewed and determined to be noncontributory to the presenting problem.      Objective:   Past Medical History:    Gout    Migraine    Migraines              History reviewed. No pertinent surgical history.             Social History     Socioeconomic History    Marital status:    Tobacco Use    Smoking status: Never    Smokeless tobacco: Never   Vaping Use    Vaping status: Never Used   Substance and Sexual Activity    Alcohol use: Yes     Alcohol/week: 0.0 standard drinks of alcohol     Comment: occas    Drug use: No   Other Topics Concern    Caffeine Concern Yes     Comment: a few times per week    Exercise No    Weight Concern Yes   Social History Narrative    ** Merged History Encounter **                   Review of Systems    Positive for stated complaint: lower back pain  Other systems are as noted in HPI.  Constitutional and vital signs reviewed.      All other systems reviewed and negative except as noted above.    Physical Exam     ED Triage Vitals [04/28/24 0833]   /75   Pulse 87   Resp 18   Temp 97.5 °F (36.4 °C)   Temp src Temporal   SpO2 99 %   O2 Device None (Room air)       Current:/75   Pulse 87   Temp 97.5 °F (36.4 °C) (Temporal)   Resp 18   SpO2 99%         Physical Exam    General  Appearance:  Alert, cooperative, no distress, appropriate for age  Head:  Normocephalic, without obvious abnormality  Neck:  Supple; symmetrical, trachea midline, no adenopathy  Lungs:  Clear to auscultation bilaterally, respirations unlabored   Heart:  Regular rate & rhythm, S1 and S2 normal, no murmurs, rubs, or gallops  Abdomen:  Soft, non-tender, bowel sounds active all four quadrants, no mass or organomegaly. No rebound tenderness or guarding  Lumbar:  Limited ROM secondary to pain, no bony ttp, + ttp over lumbar paraspinal muscles, negative SLR Bilaterally, 5/5 bilateral lower extremity motor strength exam, SILT, + 2/4 bilateral patella and achilles reflexes                Lymphatic:  No adenopathy  Skin/Hair/Nails:  Skin warm, dry and intact, no rashes or abnormal dyspigmentation  Neurologic:  Alert and oriented x3,  normal strength and tone, gait steady    ED Course   Labs Reviewed - No data to display                 MDM   Pertinent Labs & Imaging studies reviewed. (See chart for details)  Differential diagnosis considered but not limited to: Lumbar sacral strain, herniated disc cauda equina syndrome  Patient coming in with low back pain since Wednesday. Will treat for possible lumbar sacral strain. Will discharge on Norflex and prednisone. Patient is comfortable with this plan.  Overall Pt looks good. Non-toxic, well-hydrated and in no respiratory distress. Vital signs are reassuring. Exam is reassuring. I do not believe pt  requires and additional  diagnostic studiesor intervention. I believe pt  can be discharged home to continue evaluation as an outpatient. Follow-up provider given. Discharge instructions given and reviewed. Return for any problems. All understand and agreewith the plan.    Please note that this report has been produced using speech recognition software and may contain errors related to that system including, but not limited to, errors in grammar, punctuation, and spelling, as well as  words and phrases that possibly may have been recognized inappropriately.  If there are any questions or concerns, contact the dictating provider for clarification.    Note to patient: The 21st Century Cures Act makes medical notes like these available to patients in the interest of transparency. However, this is a medical document intended as peer to peer communication. It is written in medical language and may contain abbreviations or verbiage that are unfamiliar. It may appear blunt or direct. Medical documents are intended to carry relevant information, facts as evident, and the clinical opinion of the practitioner.                                      Medical Decision Making      Disposition and Plan     Clinical Impression:  1. Strain of lumbar region, initial encounter         Disposition:  Discharge  4/28/2024  8:42 am    Follow-up:  Dean Hsu, DO  [address] [facility] [address]  [phone]          Michele Spear MD  [address] [place] [address]  [phone]          Petra Tipton APRN  [address] [address] [phone]                Medications Prescribed:  Discharge Medication List as of 4/28/2024  8:43 AM        START taking these medications    Details   orphenadrine  MG Oral Tablet 12 Hr Take 100 mg by mouth 2 (two) times daily for 7 days., Normal, Disp-14 each, R-0      predniSONE 20 MG Oral Tab Take 3 tablets (60 mg total) by mouth daily for 2 days, THEN 2.5 tablets (50 mg total) daily for 2 days, THEN 2 tablets (40 mg total) daily for 2 days, THEN 1.5 tablets (30 mg total) daily for 2 days, THEN 1 tablet (20 mg total) daily for 2 days, THEN 0. 5 tablets (10 mg total) daily for 2 days., Normal, Disp-21 tablet, R-0

## 2024-05-13 ENCOUNTER — OFFICE VISIT (OUTPATIENT)
Dept: FAMILY MEDICINE CLINIC | Facility: CLINIC | Age: 34
End: 2024-05-13
Payer: OTHER GOVERNMENT

## 2024-05-13 VITALS
HEART RATE: 76 BPM | RESPIRATION RATE: 14 BRPM | TEMPERATURE: 98 F | DIASTOLIC BLOOD PRESSURE: 72 MMHG | SYSTOLIC BLOOD PRESSURE: 110 MMHG | OXYGEN SATURATION: 99 %

## 2024-05-13 DIAGNOSIS — J02.0 STREP PHARYNGITIS: Primary | ICD-10-CM

## 2024-05-13 DIAGNOSIS — J02.9 SORE THROAT: ICD-10-CM

## 2024-05-13 LAB
CONTROL LINE PRESENT WITH A CLEAR BACKGROUND (YES/NO): YES YES/NO
KIT LOT #: ABNORMAL NUMERIC
STREP GRP A CUL-SCR: POSITIVE

## 2024-05-13 RX ORDER — AMOXICILLIN 500 MG/1
500 CAPSULE ORAL 2 TIMES DAILY
Qty: 20 CAPSULE | Refills: 0 | Status: SHIPPED | OUTPATIENT
Start: 2024-05-13 | End: 2024-05-23

## 2024-05-13 NOTE — PROGRESS NOTES
Rojas Mckeon is a 33 year old male.    S:  Patient presents today with the following concerns:  Chief Complaint   Patient presents with    Upper Respiratory Infection    Feels fatigued, body aches, throat feels off, feverish.  Son with strep throat.  No N/V/D.    Current Outpatient Medications   Medication Sig Dispense Refill    amoxicillin 500 MG Oral Cap Take 1 capsule (500 mg total) by mouth 2 (two) times daily for 10 days. 20 capsule 0    clobetasol 0.05 % External Cream Apply bid x 10 days post arms 45 g 1    benzonatate (TESSALON PERLES) 100 MG Oral Cap Take 1 capsule (100 mg total) by mouth 3 (three) times daily as needed for cough. (Patient not taking: Reported on 12/27/2023) 15 capsule 0    topiramate 50 MG Oral Tab Take 1 tablet (50 mg total) by mouth 2 (two) times daily. 180 tablet 3    Rizatriptan Benzoate 10 MG Oral Tab Take 1 tablet (10 mg total) by mouth as needed for Migraine. (Patient not taking: Reported on 11/7/2023) 12 tablet 1     Patient Active Problem List   Diagnosis    Frequent headaches    Midline low back pain without sciatica    Neck pain    Migraine with aura and without status migrainosus, not intractable    Migraine     Family History   Problem Relation Age of Onset    Cancer Paternal Grandmother 75        Lung       REVIEW OF SYSTEMS:  GENERAL: feels unwell  SKIN: denies any unusual skin lesions  EYES:denies vision change  LUNGS: denies shortness of breath with exertion  CARDIOVASCULAR: denies chest pain on exertion  GI: denies abdominal pain.  No N/V/D/C  : denies dysuria  MUSCULOSKELETAL: denies back pain  NEURO: headaches    EXAM:  /72   Pulse 76   Temp 98.3 °F (36.8 °C)   Resp 14   SpO2 99%   Physical Exam  Constitutional:       Appearance: Normal appearance.   HENT:      Head: Normocephalic and atraumatic.      Right Ear: Tympanic membrane and ear canal normal.      Left Ear: Tympanic membrane and ear canal normal.      Nose: Nose normal.      Mouth/Throat:       Mouth: Mucous membranes are moist.      Pharynx: Posterior oropharyngeal erythema present.   Eyes:      Extraocular Movements: Extraocular movements intact.      Conjunctiva/sclera: Conjunctivae normal.      Pupils: Pupils are equal, round, and reactive to light.   Cardiovascular:      Rate and Rhythm: Normal rate and regular rhythm.      Heart sounds: Normal heart sounds.   Pulmonary:      Effort: Pulmonary effort is normal.      Breath sounds: Normal breath sounds.   Musculoskeletal:      Cervical back: Neck supple.   Lymphadenopathy:      Cervical: Cervical adenopathy present.   Skin:     General: Skin is warm and dry.   Neurological:      General: No focal deficit present.      Mental Status: He is alert and oriented to person, place, and time.   Psychiatric:         Mood and Affect: Mood normal.         Behavior: Behavior normal.      Rapid Strep test is Positive    ASSESSMENT AND PLAN:  Rojas Mckeon is a 33 year old male.  Encounter Diagnoses   Name Primary?    Strep pharyngitis Yes    Sore throat        No results found.     Orders Placed This Encounter   Procedures    Strep A Assay W/Optic     Meds & Refills for this Visit:  Requested Prescriptions     Signed Prescriptions Disp Refills    amoxicillin 500 MG Oral Cap 20 capsule 0     Sig: Take 1 capsule (500 mg total) by mouth 2 (two) times daily for 10 days.     Imaging & Consults:  None  Amox as above.  Contagious for 24 hours after starting antibiotics.  Change out toothbrush in 2-3 days.  Tylenol or ibuprofen prn pain.    Follow up if symptoms change, worsen, do not improve.    Patient verbalizes understanding of plan.  No follow-ups on file.

## 2024-05-14 DIAGNOSIS — G43.119 INTRACTABLE MIGRAINE WITH AURA WITHOUT STATUS MIGRAINOSUS: ICD-10-CM

## 2024-05-14 RX ORDER — TOPIRAMATE 50 MG/1
50 TABLET, FILM COATED ORAL 2 TIMES DAILY
Qty: 180 TABLET | Refills: 3 | OUTPATIENT
Start: 2024-05-14

## 2024-05-14 NOTE — TELEPHONE ENCOUNTER
Medication: topiramate 50 MG Oral Tab      Date of last refill: 4/5/23 (#180/3)  [place]     Last office visit: 4/5/23  Due back to clinic per last office note:    Return in about 1 year   Date next office visit scheduled:    Future Appointments   Date Time Provider Department Center   6/5/2024 11:00 AM Stalin Younger MD [facility]           Last OV note recommendation:    Impression/ Plan:  Rojas Mckeon is a 32 year old male with PMHx significant for chronic migraine with aura, and multiple concussions, who originally presented 8/2017, for evaluation and management.         Neurologic exam remains normal and nonfocal and CT brain with no evidence for secondary intracranial pathology.   EEG has been done and was negative for epileptiform activity or subclinical seizures, as well.     Migraines are infrequent and well controled overall on Topamax at 50 mg bid, with Rizatriptan 10 mg PRN; he was not able to tolerate weaning Topamax as he had recurrence of severe migraine and prior pruritis previously improved with increased water intake - will continue same dose of 50 mg bid for prevention; discussed interplay between environmental triggers and migraine and need to monitor for triggers       Patient was counseled regarding conservative management, stress reduction techniques, moist heat, massage, and OTC anti-inflammatory medications as needed; also discussed optimal timing of migraine specific medications for abortive therapy to maximize effectiveness, and risk of medication overuse headache.     (G43.119) Intractable migraine with aura without status migrainosus  (primary encounter diagnosis)  Plan: topiramate 50 MG Oral Tab        As noted above     Return in about 1 year (around 4/5/2024).     Stalin Younger MD, Neurology

## 2024-05-21 DIAGNOSIS — G43.119 INTRACTABLE MIGRAINE WITH AURA WITHOUT STATUS MIGRAINOSUS: ICD-10-CM

## 2024-05-21 RX ORDER — TOPIRAMATE 50 MG/1
50 TABLET, FILM COATED ORAL 2 TIMES DAILY
Qty: 60 TABLET | Refills: 0 | Status: SHIPPED | OUTPATIENT
Start: 2024-05-21

## 2024-05-21 RX ORDER — TOPIRAMATE 50 MG/1
50 TABLET, FILM COATED ORAL 2 TIMES DAILY
Qty: 180 TABLET | Refills: 0 | OUTPATIENT
Start: 2024-05-21

## 2024-05-21 NOTE — TELEPHONE ENCOUNTER
Medication: topiramate 50 MG Oral Tab      Date of last refill: 4/5/23 (#180/3)  [place]     Last office visit: 4/5/23  Due back to clinic per last office note:    Return in about 1 year   Date next office visit scheduled:           Future Appointments   Date Time Provider Department Center   6/5/2024 11:00 AM Stalin Younger MD [facility]            Last OV note recommendation:     Impression/ Plan:  Rojas Mckeon is a 32 year old male with PMHx significant for chronic migraine with aura, and multiple concussions, who originally presented 8/2017, for evaluation and management.         Neurologic exam remains normal and nonfocal and CT brain with no evidence for secondary intracranial pathology.   EEG has been done and was negative for epileptiform activity or subclinical seizures, as well.     Migraines are infrequent and well controled overall on Topamax at 50 mg bid, with Rizatriptan 10 mg PRN; he was not able to tolerate weaning Topamax as he had recurrence of severe migraine and prior pruritis previously improved with increased water intake - will continue same dose of 50 mg bid for prevention; discussed interplay between environmental triggers and migraine and need to monitor for triggers       Patient was counseled regarding conservative management, stress reduction techniques, moist heat, massage, and OTC anti-inflammatory medications as needed; also discussed optimal timing of migraine specific medications for abortive therapy to maximize effectiveness, and risk of medication overuse headache.     (G43.119) Intractable migraine with aura without status migrainosus  (primary encounter diagnosis)  Plan: topiramate 50 MG Oral Tab        As noted above     Return in about 1 year (around 4/5/2024).     Stalin Younger MD, Neurology

## 2024-06-10 ENCOUNTER — HOSPITAL ENCOUNTER (OUTPATIENT)
Dept: GENERAL RADIOLOGY | Age: 34
Discharge: HOME | End: 2024-06-10
Attending: FAMILY MEDICINE
Payer: OTHER GOVERNMENT

## 2024-06-10 ENCOUNTER — PATIENT MESSAGE (OUTPATIENT)
Dept: FAMILY MEDICINE CLINIC | Facility: CLINIC | Age: 34
End: 2024-06-10

## 2024-06-10 ENCOUNTER — HOSPITAL ENCOUNTER (OUTPATIENT)
Dept: GENERAL RADIOLOGY | Age: 34
End: 2024-06-10
Attending: FAMILY MEDICINE
Payer: OTHER GOVERNMENT

## 2024-06-10 ENCOUNTER — OFFICE VISIT (OUTPATIENT)
Dept: FAMILY MEDICINE CLINIC | Facility: CLINIC | Age: 34
End: 2024-06-10
Payer: OTHER GOVERNMENT

## 2024-06-10 VITALS
BODY MASS INDEX: 32.5 KG/M2 | HEIGHT: 70 IN | WEIGHT: 227 LBS | OXYGEN SATURATION: 99 % | DIASTOLIC BLOOD PRESSURE: 64 MMHG | HEART RATE: 80 BPM | SYSTOLIC BLOOD PRESSURE: 110 MMHG | TEMPERATURE: 98 F

## 2024-06-10 DIAGNOSIS — M25.552 PAIN OF LEFT HIP: ICD-10-CM

## 2024-06-10 DIAGNOSIS — M25.551 PAIN OF RIGHT HIP: ICD-10-CM

## 2024-06-10 DIAGNOSIS — M70.61 TROCHANTERIC BURSITIS OF BOTH HIPS: Primary | ICD-10-CM

## 2024-06-10 DIAGNOSIS — R93.89 ABNORMAL X-RAY: ICD-10-CM

## 2024-06-10 DIAGNOSIS — M70.62 TROCHANTERIC BURSITIS OF BOTH HIPS: Primary | ICD-10-CM

## 2024-06-10 DIAGNOSIS — M25.551 PAIN OF RIGHT HIP: Primary | ICD-10-CM

## 2024-06-10 PROCEDURE — 73523 X-RAY EXAM HIPS BI 5/> VIEWS: CPT | Performed by: FAMILY MEDICINE

## 2024-06-10 PROCEDURE — 99214 OFFICE O/P EST MOD 30 MIN: CPT | Performed by: FAMILY MEDICINE

## 2024-06-10 RX ORDER — PREDNISONE 20 MG/1
40 TABLET ORAL DAILY
Qty: 14 TABLET | Refills: 0 | Status: SHIPPED | OUTPATIENT
Start: 2024-06-10 | End: 2024-06-17

## 2024-06-10 NOTE — PROGRESS NOTES
Subjective:   Patient ID: Rojas Mckeon is a 33 year old male.  Hip pain  R?L  Lateral  Tight  Stretching  Aleve  W/ relief  Low Back Pain  Pertinent negatives include no fever, numbness or weakness.       History/Other:   Review of Systems   Constitutional:  Negative for chills and fever.   Gastrointestinal: Negative.    Genitourinary: Negative.    Neurological:  Negative for weakness and numbness.     Current Outpatient Medications   Medication Sig Dispense Refill    predniSONE 20 MG Oral Tab Take 2 tablets (40 mg total) by mouth daily for 7 days. 14 tablet 0    TOPIRAMATE 50 MG Oral Tab TAKE 1 TABLET(50 MG) BY MOUTH TWICE DAILY 60 tablet 0    clobetasol 0.05 % External Cream Apply bid x 10 days post arms 45 g 1    benzonatate (TESSALON PERLES) 100 MG Oral Cap Take 1 capsule (100 mg total) by mouth 3 (three) times daily as needed for cough. (Patient not taking: Reported on 12/27/2023) 15 capsule 0    Rizatriptan Benzoate 10 MG Oral Tab Take 1 tablet (10 mg total) by mouth as needed for Migraine. (Patient not taking: Reported on 11/7/2023) 12 tablet 1     Allergies:No Known Allergies    Objective:   Physical Exam  Vitals reviewed.   Constitutional:       Appearance: Normal appearance. He is obese.   Cardiovascular:      Rate and Rhythm: Normal rate and regular rhythm.      Pulses: Normal pulses.      Heart sounds: Normal heart sounds.   Pulmonary:      Effort: Pulmonary effort is normal.      Breath sounds: Normal breath sounds.   Abdominal:      General: Bowel sounds are normal.      Palpations: Abdomen is soft.   Musculoskeletal:      Cervical back: Neck supple.      Comments: Symmetric internal and external rotation hips bilaterally.  Tender to palpation superior trochanteric.   Skin:     General: Skin is warm and dry.      Findings: No rash.   Neurological:      General: No focal deficit present.      Mental Status: He is alert and oriented to person, place, and time.      Comments: Negative straight leg  raising         Assessment & Plan:   1. Trochanteric bursitis of both hips    2. Pain of right hip    3. Pain of left hip        No orders of the defined types were placed in this encounter.      Meds This Visit:  Requested Prescriptions     Signed Prescriptions Disp Refills    predniSONE 20 MG Oral Tab 14 tablet 0     Sig: Take 2 tablets (40 mg total) by mouth daily for 7 days.       Imaging & Referrals:  XR HIP + PELVIS MIN 4 VIEWS RIGHT (CPT=73503)  XR HIP W OR WO PELVIS 2 OR 3 VIEWS, LEFT (CPT=73502)

## 2024-06-10 NOTE — PATIENT INSTRUCTIONS
X-ray hips bilaterally.  Prednisone x 7 days.  Discussed physical therapy/orthopedic evaluation.  Call with questions or problems.

## 2024-06-10 NOTE — TELEPHONE ENCOUNTER
From: Rojas Mckeon  To: Dean Hsu  Sent: 6/10/2024 2:58 PM CDT  Subject: X ray results     Just a follow up to your comment on the images. Would you like me to schedule a ortho appointment?

## 2024-06-11 ENCOUNTER — TELEPHONE (OUTPATIENT)
Dept: ORTHOPEDICS CLINIC | Facility: CLINIC | Age: 34
End: 2024-06-11

## 2024-06-11 NOTE — TELEPHONE ENCOUNTER
Patient is coming in for bilat hip pain.    XRAY in EP[facility].    Please advise if anything else is needed    Future Appointments   Date Time Provider Department Center   6/12/2024 10:50 AM [id number]   6/28/2024 10:40 AM Stalin Younger MD [facility] [place]

## 2024-06-12 ENCOUNTER — OFFICE VISIT (OUTPATIENT)
Dept: ORTHOPEDICS CLINIC | Facility: CLINIC | Age: 34
End: 2024-06-12
Payer: OTHER GOVERNMENT

## 2024-06-12 VITALS — BODY MASS INDEX: 32.58 KG/M2 | HEIGHT: 69 IN | WEIGHT: 220 LBS

## 2024-06-12 DIAGNOSIS — G89.29 CHRONIC SI JOINT PAIN: Primary | ICD-10-CM

## 2024-06-12 DIAGNOSIS — M70.62 TROCHANTERIC BURSITIS OF BOTH HIPS: ICD-10-CM

## 2024-06-12 DIAGNOSIS — M53.3 CHRONIC SI JOINT PAIN: Primary | ICD-10-CM

## 2024-06-12 DIAGNOSIS — M70.61 TROCHANTERIC BURSITIS OF BOTH HIPS: ICD-10-CM

## 2024-06-12 PROCEDURE — 99213 OFFICE O/P EST LOW 20 MIN: CPT | Performed by: PHYSICIAN ASSISTANT

## 2024-06-12 RX ORDER — MELOXICAM 15 MG/1
15 TABLET ORAL DAILY
Qty: 30 TABLET | Refills: 0 | Status: SHIPPED | OUTPATIENT
Start: 2024-06-12 | End: 2024-07-12

## 2024-06-12 NOTE — H&P
[facility] - ORTHOPEDICS  [address] [place] [id number]  [phone]     NEW PATIENT VISIT - HISTORY AND PHYSICAL EXAMINATION     Name: Rojas Mckeon   MRN: [id number]  Date: 6/12/2024     CC: Bilateral hip pain.    REFERRED BY: Dean Hsu DO    HPI:   Rojas Mckeon is a very pleasant 33 year old male who presents today for evaluation, consultation, and management of BILATERAL hip pain, right worse than left- started of May of 2024 after he threw out his back and was placed on a steroid but noted persistent hip pain. No tingling or numbness. Normal bowel/bladder function. He describes stiffness, 3/10 pain.       PMH:   Past Medical History:    Gout    Migraine    Migraines       PAST SURGICAL HX:  History reviewed. No pertinent surgical history.    FAMILY HX:  Family History   Problem Relation Age of Onset    Cancer Paternal Grandmother 75        Lung       ALLERGIES:  Patient has no known allergies.    MEDICATIONS:   Current Outpatient Medications   Medication Sig Dispense Refill    Meloxicam 15 MG Oral Tab Take 1 tablet (15 mg total) by mouth daily. Take 1 tablet by mouth daily for 30 days with food. 30 tablet 0    predniSONE 20 MG Oral Tab Take 2 tablets (40 mg total) by mouth daily for 7 days. 14 tablet 0    TOPIRAMATE 50 MG Oral Tab TAKE 1 TABLET(50 MG) BY MOUTH TWICE DAILY 60 tablet 0    clobetasol 0.05 % External Cream Apply bid x 10 days post arms 45 g 1    benzonatate (TESSALON PERLES) 100 MG Oral Cap Take 1 capsule (100 mg total) by mouth 3 (three) times daily as needed for cough. (Patient not taking: Reported on 12/27/2023) 15 capsule 0    Rizatriptan Benzoate 10 MG Oral Tab Take 1 tablet (10 mg total) by mouth as needed for Migraine. (Patient not taking: Reported on 6/12/2024) 12 tablet 1       ROS: A comprehensive 14 point review of systems was performed and was negative aside from the aforementioned per history of present illness.    SOCIAL HX:  Social History      Occupational History    Not on file   Tobacco Use    Smoking status: Never    Smokeless tobacco: Never   Vaping Use    Vaping status: Never Used   Substance and Sexual Activity    Alcohol use: Yes     Alcohol/week: 0.0 standard drinks of alcohol     Comment: occas    Drug use: No    Sexual activity: Not on file       PE:   Vitals:    06/12/24 1043   Weight: 220 lb (99.8 kg)   Height: 5' 9\" (1.753 m)     Estimated body mass index is 32.49 kg/m² as calculated from the following:    Height as of this encounter: 5' 9\" (1.753 m).    Weight as of this encounter: 220 lb (99.8 kg).    Physical Exam  Constitutional:       Appearance: Normal appearance.   HENT:      Head: Normocephalic and atraumatic.   Eyes:      Extraocular Movements: Extraocular movements intact.   Neck:      Musculoskeletal: Normal range of motion and neck supple.   Cardiovascular:      Pulses: Normal pulses.   Pulmonary:      Effort: Pulmonary effort is normal. No respiratory distress.   Abdominal:      General: There is no distension.   Skin:     General: Skin is warm.      Capillary Refill: Capillary refill takes less than 2 seconds.      Findings: No bruising.   Neurological:      General: No focal deficit present.      Mental Status: Alert.   Psychiatric:         Mood and Affect: Mood normal.     Examination of the right hip and left hip demonstrates:     On physical examination patient is alert and oriented x3, in no apparent or acute distress.   Skin is intact, warm and dry.     The patient demonstrates a Antalgic gait.  Patient has extension to 0 degrees, flexion to 120 degrees.   The patient has 30 degrees  of internal rotation, and 25 degrees of external rotation.     On provocative examination, there is a Negative subspine, Negative trochanteric pain sign, and Negative FADIR and Negative STEPHANIE testing.     There is a Negative log roll, circumduction clunk.     Negative Michele test.     The patient has 4+/5 strength with hip flexion, 4+/5  with abduction and adduction bilaterally.   The patient has no tenderness over the ASIS, no tenderness at the hip flexor, no tenderness at the iliac crest, no at the ischium, no no at the greater trochanter, no at the SI joint.     There is no tenderness at over the adductor, pubic tubercle, or pain with resisted adduction.       No obvious peripheral edema noted.   Distal neurovascular exam demonstrates normal perfusion, intact sensation to light touch and preserved strength.     Radiographic Examination/Diagnostics:  I personally viewed, independently interpreted and radiology report was reviewed.      XR HIP W OR WO PELVIS(MIN 5 VIEWS),BILAT(CPT=73523)    Result Date: 6/10/2024  PROCEDURE:  XR HIP W OR WO PELVIS (MIN 5 VIEWS), BILAT (CPT=73523)  TECHNIQUE:  Bilateral min 5 views of the hip and pelvis if performed.  COMPARISON:  None.  INDICATIONS:  M25.551 Pain of right hip  PATIENT STATED HISTORY: (As transcribed by Technologist)  Bilateral hip pain for a month.               CONCLUSION:   Negative for fracture or malalignment of the pelvis or hips.  There is subtle focal convexity of the superior femoral head/neck junction bilaterally.  Additionally, there is prominent spurring of the superior acetabulum bilaterally.  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  Hip joint spaces are generally preserved.  Obturator rings are intact.  Normal sacroiliac joints mild sclerotic changes of the pubic symphysis.   LOCATION:  [facility]   Dictated by (CST): Aishwarya Shetty MD on 6/10/2024 at 1:37 PM     Finalized by (CST): Aishwarya Shetty MD on 6/10/2024 at 1:39 PM         IMPRESSION: Rojas Mckeon is a 33 year old male who presents with right and left SI joint pain and trochanteric bursitis.     PLAN:   We had a detailed discussion outlining the etiology, anatomy, pathophysiology, and natural history of the patient's findings. Imaging was reviewed in detail and correlated to a 3-dimensional  model of the patient's pathology.     We reviewed the treatment of this disease condition.  Fortunately, treatment is amenable to conservative treatment which we chose to optimize at today's visit.  We prescribed Meloxicam 15mg.     We recommended physical therapy to aid in strengthening, range of motion, functional improvement, and return to baseline activity.      If not improved at next visit, bilateral troch bursal injections.     The patient had the opportunity to ask questions and all questions were answered appropriately.      FOLLOW-UP:  8 weeks or as needed.             Sugar Pickett [facility], PA-C Orthopedic Surgery / Sports Medicine Specialist  [facility]  [address] [place] [address].org  [email or web address]  t: [phone]  o: [phone]  f: [phone]    This note was dictated using Dragon software.  While it was briefly proofread prior to completion, some grammatical, spelling, and word choice errors due to dictation may still occur.

## 2024-06-19 ENCOUNTER — TELEPHONE (OUTPATIENT)
Dept: PHYSICAL THERAPY | Facility: HOSPITAL | Age: 34
End: 2024-06-19

## 2024-06-25 ENCOUNTER — TELEPHONE (OUTPATIENT)
Dept: PHYSICAL THERAPY | Facility: HOSPITAL | Age: 34
End: 2024-06-25

## 2024-06-27 ENCOUNTER — OFFICE VISIT (OUTPATIENT)
Dept: PHYSICAL THERAPY | Age: 34
End: 2024-06-27
Attending: PHYSICIAN ASSISTANT

## 2024-06-27 DIAGNOSIS — M53.3 CHRONIC SI JOINT PAIN: Primary | ICD-10-CM

## 2024-06-27 DIAGNOSIS — M70.61 TROCHANTERIC BURSITIS OF BOTH HIPS: ICD-10-CM

## 2024-06-27 DIAGNOSIS — M70.62 TROCHANTERIC BURSITIS OF BOTH HIPS: ICD-10-CM

## 2024-06-27 DIAGNOSIS — G89.29 CHRONIC SI JOINT PAIN: Primary | ICD-10-CM

## 2024-06-27 PROCEDURE — 97161 PT EVAL LOW COMPLEX 20 MIN: CPT

## 2024-06-27 PROCEDURE — 97110 THERAPEUTIC EXERCISES: CPT

## 2024-06-27 NOTE — PROGRESS NOTES
LOWER EXTREMITY EVALUATION:     Diagnosis:   Chronic SI joint pain (M53.3,G89.29)  Trochanteric bursitis of both hips (M70.61,M70.62)          Referring Provider: Sugar Pickett  Date of Evaluation:    6/27/2024    Precautions:  None Next MD visit:   none scheduled  Date of Surgery: n/a     PATIENT SUMMARY   Rojas Mckeon is a 33 year old male who presents to therapy today with complaints of lasting B hip pain following back injury in May. He received steroids, back resolved and B hip pain remains. R> L. He states no one position is relieving, any position for too long is uncomfortable.   Pt describes pain level current 2/10, at best 1/10, at worst 4/10. Aching in nature  He states he can do most things, just with pain. He reports the most painful thing is sitting and driving, moving pedal to pedal. He has a long commute when school resumes. 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   He is a runner for exercise and can but has more pain and stiffness, noted in calves and hips.   Current functional limitations include lower body dressing, walking > 1 mile, standing and sitting < 1 hour, driving, sleep participation and bed mobility, stooping, squatting, lifting and carrying, heavy ADLs     Rojas describes prior level of function active, independent, running for exercise. Pt goals include \"reduce pain in hips\" return to running.  Past medical history was reviewed with Rojas. Significant findings include  has a past medical history of Gout (04/2019), Migraine, and Migraines.    ASSESSMENT  Rojas presents to physical therapy evaluation with primary c/o lasting B hip pain following back injury in May. The results of the objective tests and measures show limited rotation B hips, LE weakness, silviano B glut med, decreased SLS time, limited flexiblity in hip flexor and rotators all impacting efficiency of movement and exacerbating pain with increased activity.  Functional deficits include but are not limited to  lower body dressing, walking > 1 mile, standing and sitting < 1 hour, driving, sleep participation and bed mobility, stooping, squatting, lifting and carrying, heavy ADLs.  Signs and symptoms are consistent with diagnosis of B hip pain, trochanteric bursitis, ROBER B hips. Pt and PT discussed evaluation findings, pathology, POC and HEP.  Pt's comorbidities and psychosocial issues may impact PT POC and pt progress.Pt voiced understanding and performs HEP correctly without reported pain. Skilled Physical Therapy is medically necessary to address the above impairments and reach functional goals.     OBJECTIVE:   Palpation: min TTP piriformis, glut med and greater troch B  Sensation: light touch intact    AROM: (* denotes performed with pain)  Hip Knee   Flexion: R 101; L 100  Extension: R WFL; L WFL  Abduction: R 35; L 40  ER R 10; L 12  IR: R 35; L 33  Flexion: R WNL; L WNL  Extension: R WNL; L WNL      Lumbar AROM:  FLEX WFL  EXT WFL  Rot R WFL Rot L WFL  SB R WFL SB L WFL    Flexibility:  Hip Flexor: R mod, L mod  Hamstrings: R min; L min  Piriformis: R min; L min  Quads: R min; L min  Gastroc-soleus: R min; L min  Glut med 4-/5 BLE     Strength/MMT: (* denotes performed with pain)  Hip Knee Foot/Ankle   Flexion: R 4+/5; L 4+/5  Extension: R 4/5; L 4/5  Abduction: R 4-/5; L 4-/5  ER: R 4-/5; L 4-/5  IR: R 4+/5; L 4+/5  Glut med 4-/5 BLE  Flexion: R 4+/5; L 4+/5  Extension: R 5/5; L 4+/5    DF: R 5/5; L 5/5  PF: R 5/5; L 5/5       Special tests:   Scour Test: R -, L -  Michele Test: R -, L -  Cathi Test: R +, L +  Damion's Test: R -, L -  Leg Length: R -, L -  SI compression/distraction: -  Trendelenburg Sign: R -, L -      Gait: pt ambulates on level ground with antalgia  Balance: SLS: R 35 sec, L 28 sec    Today’s Treatment and Response:   Pt education was provided on exam findings, treatment diagnosis, treatment plan, expectations, and prognosis. Pt was also provided recommendations for activity modifications, possible  soreness after evaluation, modalities as needed [ice/heat], and importance of remaining active.  Patient was instructed in and issued a HEP for: Access Code: [email or web address]  URL: [email or web address]  Date: 06/27/2024  Prepared by: Katie Oh    Exercises  - Sidelying Clamshell in Neutral  - 1 x daily - 7 x weekly - 3 sets - 10 reps  - Supine Figure 4 Piriformis Stretch with Leg Extension  - 1 x daily - 7 x weekly - 3 sets - 1 reps - 30 sec  hold  - Seated Table Piriformis Stretch  - 1 x daily - 7 x weekly - 3 sets - 1 reps - 30 sec  hold  - Quadruped Piriformis Stretch  - 1 x daily - 7 x weekly - 3 sets - 1 reps - 30 sec hold  Reclining cow face legs each LE 30 sec 3x daily     Charges: PT Eval Low Complexity, 1 ther ex      Total Timed Treatment: 12 min     Total Treatment Time: 45 min     PLAN OF CARE:    Goals: (to be met in 8 visits)  Pt will have improved hip AROM ER to > 14 deg to be able to don/doff shoes and perform car transfers without difficulty   Pt will improve hip ABD and ER strength to 5/5 to increase ease with standing and walking   Pt will be able to squat to  light objects around the house with <0/10 hip pain   Pt will improve functional hip strength to report ability to ascend/descend 1 flight of stairs reciprocally without use of handrail   Pt will demonstrate improved SLS to >50 seconds BRITTANY to promote safety and decrease risk of falls on uneven surfaces such as grass and gravel   Pt will report return to running without symptom increase before 1 mile allen to facilitate return to exercise   Pt will be independent and compliant with comprehensive HEP to maintain progress achieved in PT     Frequency / Duration: Patient will be seen for 1-2 x/week or a total of 8 visits over a 90 day period. Treatment will include: Gait training, Manual Therapy, Mechanical Traction, Neuromuscular Re-education, Self-Care Home Management, Therapeutic Activities, Therapeutic Exercise, Home  Exercise Program instruction, and Modalities to include: Electrical stimulation (unattended), Electrical stimulation (attended), and Ultrasound    Education or treatment limitation:  see assessment  Rehab Potential:good    LEFS Score  LEFS Score: 83.75 % (6/20/2024  9:55 AM)      Patient/Family/Caregiver was advised of these findings, precautions, and treatment options and has agreed to actively participate in planning and for this course of care.    Thank you for your referral. Please co-sign or sign and return this letter via fax as soon as possible to [phone]. If you have any questions, please contact me at Dept: [phone]    Sincerely,  Electronically signed by therapist: Vanita Oh, PT, DPT  Physician's certification required: Yes  I certify the need for these services furnished under this plan of treatment and while under my care.    X___________________________________________________ Date____________________    Certification From: 6/27/2024  To:9/25/2024

## 2024-06-28 ENCOUNTER — OFFICE VISIT (OUTPATIENT)
Dept: NEUROLOGY | Facility: CLINIC | Age: 34
End: 2024-06-28

## 2024-06-28 VITALS
SYSTOLIC BLOOD PRESSURE: 100 MMHG | HEIGHT: 69 IN | DIASTOLIC BLOOD PRESSURE: 64 MMHG | BODY MASS INDEX: 32.58 KG/M2 | HEART RATE: 56 BPM | WEIGHT: 220 LBS | RESPIRATION RATE: 16 BRPM

## 2024-06-28 DIAGNOSIS — G43.119 INTRACTABLE MIGRAINE WITH AURA WITHOUT STATUS MIGRAINOSUS: Primary | ICD-10-CM

## 2024-06-28 PROCEDURE — 99213 OFFICE O/P EST LOW 20 MIN: CPT | Performed by: OTHER

## 2024-06-28 RX ORDER — TOPIRAMATE 50 MG/1
50 TABLET, FILM COATED ORAL 2 TIMES DAILY
Qty: 180 TABLET | Refills: 3 | Status: SHIPPED | OUTPATIENT
Start: 2024-06-28

## 2024-06-28 RX ORDER — RIZATRIPTAN BENZOATE 10 MG/1
10 TABLET ORAL AS NEEDED
Qty: 12 TABLET | Refills: 5 | Status: SHIPPED | OUTPATIENT
Start: 2024-06-28

## 2024-06-28 NOTE — PATIENT INSTRUCTIONS
Refill policies:    Allow 2-3 business days for refills; controlled substances may take longer.  Contact your pharmacy at least 5 days prior to running out of medication and have them send an electronic request or submit request through the “request refill” option in your [employer] account.  Refills are not addressed on weekends; covering physicians do not authorize routine medications on weekends.  No narcotics or controlled substances are refilled after noon on Fridays or by on call physicians.  By law, narcotics must be electronically prescribed.  A 30 day supply with no refills is the maximum allowed.  If your prescription is due for a refill, you may be due for a follow up appointment.  To best provide you care, patients receiving routine medications need to be seen at least once a year.  Patients receiving narcotic/controlled substance medications need to be seen at least once every 3 months.  In the event that your preferred pharmacy does not have the requested medication in stock (e.g. Backordered), it is your responsibility to find another pharmacy that has the requested medication available.  We will gladly send a new prescription to that pharmacy at your request.    Scheduling Tests:    If your physician has ordered radiology tests such as MRI or CT scans, please contact Central Scheduling at [phone] right away to schedule the test.  Once scheduled, the [facility] Centralized Referral Team will work with your insurance carrier to obtain pre-certification or prior authorization.  Depending on your insurance carrier, approval may take 3-10 days.  It is highly recommended patients assure they have received an authorization before having a test performed.  If test is done without insurance authorization, patient may be responsible for the entire amount billed.      Precertification and Prior Authorizations:  If your physician has recommended that you have a procedure or additional testing performed the [facility]  Centralized Referral Team will contact your insurance carrier to obtain pre-certification or prior authorization.    You are strongly encouraged to contact your insurance carrier to verify that your procedure/test has been approved and is a COVERED benefit.  Although the [facility] Centralized Referral Team does its due diligence, the insurance carrier gives the disclaimer that \"Although the procedure is authorized, this does not guarantee payment.\"    Ultimately the patient is responsible for payment.   Thank you for your understanding in this matter.  Paperwork Completion:  If you require FMLA or disability paperwork for your recovery, please make sure to either drop it off or have it faxed to our office at [phone]. Be sure the form has your name and date of birth on it.  The form will be faxed to our Forms Department and they will complete it for you.  There is a 25$ fee for all forms that need to be filled out.  Please be aware there is a 10-14 day turnaround time.  You will need to sign a release of information (JAYDEN) form if your paperwork does not come with one.  You may call the Forms Department with any questions at [phone].  Their fax number is [phone].

## 2024-06-28 NOTE — PROGRESS NOTES
[facility] Progress Note    HPI  Chief Complaint   Patient presents with    Migraine     Topiramate preventative/Rizatriptan abortive, notes ~3 events in last year, optimal abortive response w/ 1 dose triptan       As per my initial H&P from 8/22/17:    \"Rojas Mckeon is a 26 year old, who presents for evaluation of headaches.       Patient states he has history of migraines since age 15 - gets aura of squiggly lines in left eye x30 minutes, then pain in R side of neck and behind R eye, which may progress to slurred speech and sometimes with confusion; also with numbness in both hands, along with nausea / dizziness; migraines usually lasts 5-6 hours and denies LOC.   Patient has had CT brain done in the past but never had MRI or EEG.  Patient denies migraine without aura.      He usually has tried sumatriptan in the past for migraines but this did not improve; he also states he is on amitripytline 50 mg nightly.      Usually he has 3-4  Migraines per month but has been having more frequent migraines since a head injury ~ 1 month ago.   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      He feels his current cycle of migraines is the same as usual but more frequent.  He notes triggers of acidic foods (ie orange juice); denies alcohol as a trigger.      He states he normally takes the sumatriptan within the first 30 minutes but did not have improvement.  He has also been started on fioricet for abortive therapy but does not noted improvement with the medication.      Patient states his father has similar migraines in the past up until age 30 but now only has aura.  Dad's father had heart surgery in his 60s but no other family history of early stroke.        Otherwise, patient denies any recent weight change, fevers, chills, nausea, double vision/  blurry vision / loss of vision, chest pain, palpitations, shortness of breath, rashes, joint pains, bowel / bladder incontinence or mood issues. \"      Prior notes as per 12/27/2021.  Patient last seen 8/2020.  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  He states he had improvement with Maxalt but migraines still lasted ~ 6 hrs; pain would improve from 7 to 8 to 1 or 2 but he still would have recurrence daily over ~ 2 week time period. However, other than this instance, he has been doing well and remains on Topamax 50 mg bid.  On average, he is having only 0 to 1 migraines per month otherwise.  He denies any history of COVID-19 infection and denied any associated lack of taste or smell or shortness of breath with this instance; is fully vaccinated with booster in 10/2021 and denies any side effects.      He otherwise denies any side effects on Topamax 50 mg bid and denies  mood changes, difficulty with concentrating, or word finding, or tingling/paresthesias in the hands or feet.  In addition, he denies any kidney stones or pain with urination.        Prior notes as per 4/5/2023.  Patient last seen 12/27/2021.  He had been on Topamax 50 mg bid and states when he takes the medication, he has maybe 1 migraine per month.  He did run out of Topamax briefly for about a week in February 2023 and noted he had more frequent migraines when he was off the medication for ~ 4 days in a row and treated migraines with rizatriptan during this time.  He resumed taking Topamax at 50 mg bid in ~ mid February 2023 and since this time has been doing well and has not used rizatriptan recently. He denies any side effects on Topamax 50 mg bid; no cognitive changes, tingling/paresthesias in hands or feet and denies kidney stones or pain with urination.        Patient last seen 4/5/2023.  He remains on  Topamax 50 mg bid.  He has only had 3 migraines in the past year; doing well; no side effects; last migraine 1 month ago and responded to rizatriptan 10 mg with one dose.       Past Medical History:    Gout    Migraine    Migraines     History reviewed. No pertinent surgical history.  Family History   Problem Relation Age of Onset    Cancer Paternal Grandmother 75        Lung     Social History     Socioeconomic History    Marital status:    Tobacco Use    Smoking status: Never    Smokeless tobacco: Never   Vaping Use    Vaping status: Never Used   Substance and Sexual Activity    Alcohol use: Yes     Alcohol/week: 0.0 standard drinks of alcohol     Comment: occas    Drug use: No   Other Topics Concern    Caffeine Concern Yes     Comment: a few times per week    Exercise No    Weight Concern Yes       No Known Allergies      Current Outpatient Medications:     topiramate 50 MG Oral Tab, Take 1 tablet (50 mg total) by mouth 2 (two) times daily., Disp: 180 tablet, Rfl: 3    Rizatriptan Benzoate 10 MG Oral Tab, Take 1 tablet (10 mg total) by mouth as needed for Migraine., Disp: 12 tablet, Rfl: 5    Meloxicam 15 MG Oral Tab, Take 1 tablet (15 mg total) by mouth daily. Take 1 tablet by mouth daily for 30 days with food., Disp: 30 tablet, Rfl: 0    clobetasol 0.05 % External Cream, Apply bid x 10 days post arms, Disp: 45 g, Rfl: 1    Review of Systems:  No chest pain or palpitations; otherwise as noted in HPI.    Exam:  /64 (BP Location: Left arm, Patient Position: Sitting, Cuff Size: adult)   Pulse 56   Resp 16   Ht 69\"   Wt 220 lb (99.8 kg)   BMI 32.49 kg/m²   Estimated body mass index is 32.49 kg/m² as calculated from the following:    Height as of this encounter: 69\".    Weight as of this encounter: 220 lb (99.8 kg).    Gen: well developed, well nourished, no acute distress  HEENT: normocephalic  Heart; normal S1/S2, regular rate and rhythm  Lungs: clear to auscultation bilaterally  Extremities: no  edema, peripheral pulses intact    Neck: supple, full range of motion; no carotid bruits    Neuro:  Mental status:  Orientation: Alert and oriented to person, place, time  Speech Fluent and conversational    CN: PERRL, EOMI with no nystagmus, VFF, smile symmetric, sensation intact, tongue and palate midline, SCM/hearing intact, otherwise, CN 2-12 intact   Motor: 5/5 strength throughout, tone normal  Sensory: intact to light touch throughout  Coord: FNF intact with no tremor or dysmetria; rapid alternating movements intact bilaterally  Romberg: absent  Gait: normal casual, heel, toe and tandem gait    Labs:  None new    Imaging:  None new     Prior as noted below:   CT brain (7/25/17): report reviewed from OSH; no acute intracranial findings     Other procedures  None new  Prior as noted below:     EEG (9/19/17):   Impression:   This EEG is normal.  No epileptiform activity, abnormal slowing or clinical or electrographic seizures were noted on this awake and asleep  recording.            Impression/ Plan:  Rojas Mckeon is a 33 year old male with PMHx significant for chronic migraine with aura, and multiple concussions, who originally presented 8/2017, for evaluation and management.        Neurologic exam remains normal and nonfocal and CT brain with no evidence for secondary intracranial pathology.   EEG has been done and was negative for epileptiform activity or subclinical seizures, as well.     Migraines are infrequent and well controled overall on Topamax at 50 mg bid, with Rizatriptan 10 mg PRN; he was not able to tolerate weaning Topamax as he had recurrence of severe migraine and prior pruritis previously improved with increased water intake - will continue same dose of 50 mg bid for prevention; discussed interplay between environmental triggers and migraine and need to monitor for triggers      Patient was counseled regarding conservative management, stress reduction techniques, moist heat, massage, and OTC  anti-inflammatory medications as needed; also discussed optimal timing of migraine specific medications for abortive therapy to maximize effectiveness, and risk of medication overuse headache.    1. Intractable migraine with aura without status migrainosus  As noted above   - topiramate 50 MG Oral Tab; Take 1 tablet (50 mg total) by mouth 2 (two) times daily.  Dispense: 180 tablet; Refill: 3  - Rizatriptan Benzoate 10 MG Oral Tab; Take 1 tablet (10 mg total) by mouth as needed for Migraine.  Dispense: 12 tablet; Refill: 5    Return in about 1 year (around 6/28/2025).    Stalin Younger MD, Neurology  [facility]  Pager [phone]  6/28/2024

## 2024-07-01 ENCOUNTER — OFFICE VISIT (OUTPATIENT)
Dept: PHYSICAL THERAPY | Age: 34
End: 2024-07-01
Attending: PHYSICIAN ASSISTANT
Payer: OTHER GOVERNMENT

## 2024-07-01 PROCEDURE — 97110 THERAPEUTIC EXERCISES: CPT

## 2024-07-01 PROCEDURE — 97140 MANUAL THERAPY 1/> REGIONS: CPT

## 2024-07-01 NOTE — PROGRESS NOTES
Diagnosis:   Chronic SI joint pain (M53.3,G89.29)  Trochanteric bursitis of both hips (M70.61,M70.62)      Referring Provider: Sugar Pickett  Date of Evaluation:    6/27/24    Precautions:  None Next MD visit:   none scheduled  Date of Surgery: n/a   Insurance Primary/Secondary:  / N/A     # Auth Visits: Medical Necessity            Subjective: Pt reports feeling tight, like a pinch in both hips worse in sitting.     Pain: 3/10      Objective:   All of the below objective measures are from evaluation for reference:  AROM: (* denotes performed with pain)  Hip Knee   Flexion: R 101; L 100  Extension: R WFL; L WFL  Abduction: R 35; L 40  ER R 10; L 12  IR: R 35; L 33  Flexion: R WNL; L WNL  Extension: R WNL; L WNL            Assessment: Additions of lateral hip strength for glut med with min tactile cues for posterior weight shift, decreased anterior tibial translation for leg press to isolate gluts vs quad. Reinforced stretching priority given mm length vs strength at this time.      Goals:    (to be met in 8 visits)  Pt will have improved hip AROM ER to > 14 deg to be able to don/doff shoes and perform car transfers without difficulty   Pt will improve hip ABD and ER strength to 5/5 to increase ease with standing and walking   Pt will be able to squat to  light objects around the house with <0/10 hip pain   Pt will improve functional hip strength to report ability to ascend/descend 1 flight of stairs reciprocally without use of handrail   Pt will demonstrate improved SLS to >50 seconds BRITTANY to promote safety and decrease risk of falls on uneven surfaces such as grass and gravel   Pt will report return to running without symptom increase before 1 mile allen to facilitate return to exercise   Pt will be independent and compliant with comprehensive HEP to maintain progress achieved in PT    Plan: Address ROM deficits, soft tissue length, glut strength and pelvic stability for return to PLOF.  Date:  7/1/2024  TX#: 2/8 Date:                 TX#: 3/ Date:                 TX#: 4/ Date:                 TX#: 5/ Date:   Tx#: 6/   THERE EX: 35 min  Warm up Nu step 6 mins level 3 Les only  Gastroc stretch in galicia board 30 sec 3x  Manual IP/quad and ITB stretch SL each LE 30 sec 3x   Clams 15x each LE  Supine manual hamstring, glut, piriformis stretch 30 sec   Forward and backward monster walk red band 20ftx2  Reclining cow face leg stretch 30 sec 2x each LE  SL SL shuttle leg press 37 lbs  LLE 25lbs RLE10x each       MANUAL:  STM piriformis, glut med and lateral to SIJ 10 mins R LE effleurage                      HEP:  Access Code: [email or web address]  URL: [email or web address]  Date: 06/27/2024  Prepared by: Katie Oh     Exercises  - Sidelying Clamshell in Neutral  - 1 x daily - 7 x weekly - 3 sets - 10 reps  - Supine Figure 4 Piriformis Stretch with Leg Extension  - 1 x daily - 7 x weekly - 3 sets - 1 reps - 30 sec  hold  - Seated Table Piriformis Stretch  - 1 x daily - 7 x weekly - 3 sets - 1 reps - 30 sec  hold  - Quadruped Piriformis Stretch  - 1 x daily - 7 x weekly - 3 sets - 1 reps - 30 sec hold  Reclining cow face legs each LE 30 sec 3x daily     Charges: 2 ther ex 1 manual        Total Timed Treatment: 45 min  Total Treatment Time: 45 min

## 2024-07-02 ENCOUNTER — APPOINTMENT (OUTPATIENT)
Dept: PHYSICAL THERAPY | Age: 34
End: 2024-07-02
Attending: PHYSICIAN ASSISTANT
Payer: OTHER GOVERNMENT

## 2024-07-08 ENCOUNTER — OFFICE VISIT (OUTPATIENT)
Dept: PHYSICAL THERAPY | Age: 34
End: 2024-07-08
Attending: PHYSICIAN ASSISTANT
Payer: OTHER GOVERNMENT

## 2024-07-08 PROCEDURE — 97110 THERAPEUTIC EXERCISES: CPT

## 2024-07-08 PROCEDURE — 97140 MANUAL THERAPY 1/> REGIONS: CPT

## 2024-07-08 NOTE — PROGRESS NOTES
Diagnosis:   Chronic SI joint pain (M53.3,G89.29)  Trochanteric bursitis of both hips (M70.61,M70.62)      Referring Provider: Sugar Pickett  Date of Evaluation:    6/27/24    Precautions:  None Next MD visit:   none scheduled  Date of Surgery: n/a   Insurance Primary/Secondary:  / N/A     # Auth Visits: Medical Necessity            Subjective: Pt reports over the weekend, L hip feeling pretty good but the R hip has had constant stabbing pain \"like someone has a knife in my hip\" he reports 1 episode of popping without change in pain and gesturing posterior to greater troch for area of pain. He reports standing was difficult and at times he could barely walk.    Pain: 7/10      Objective:   7/8/24- TTP and hypertonic glut med and piriformis RLE  All of the below objective measures are from evaluation for reference:  AROM: (* denotes performed with pain)  Hip Knee   Flexion: R 101; L 100  Extension: R WFL; L WFL  Abduction: R 35; L 40  ER R 10; L 12  IR: R 35; L 33  Flexion: R WNL; L WNL  Extension: R WNL; L WNL            Assessment: Likely tendonitis at ER of RLE, potential bursitis given pain level. Added glut med band hip extension to strength and support hip. Session more aggressive with stretching for rotator length. Instruction on reps for HEP to fatigue with solid form.      Goals:    (to be met in 8 visits)  Pt will have improved hip AROM ER to > 14 deg to be able to don/doff shoes and perform car transfers without difficulty   Pt will improve hip ABD and ER strength to 5/5 to increase ease with standing and walking   Pt will be able to squat to  light objects around the house with <0/10 hip pain   Pt will improve functional hip strength to report ability to ascend/descend 1 flight of stairs reciprocally without use of handrail   Pt will demonstrate improved SLS to >50 seconds BRITTANY to promote safety and decrease risk of falls on uneven surfaces such as grass and gravel   Pt will report return to  running without symptom increase before 1 mile allen to facilitate return to exercise   Pt will be independent and compliant with comprehensive HEP to maintain progress achieved in PT    Plan: Add banded squats, lateral walk outs, US potentially if no improvement in pain. Focus ITB length, Rotator length and glut strength to reduce femoral rotation with knee flexion.  Date: 7/1/2024  TX#: 2/8 Date:  7/8/24               TX#: 3/8 Date:                 TX#: 4/ Date:                 TX#: 5/ Date:   Tx#: 6/   THERE EX: 35 min  Warm up Nu step 6 mins level 3 Les only  Gastroc stretch in galicia board 30 sec 3x  Manual IP/quad and ITB stretch SL each LE 30 sec 3x   Clams 15x each LE  Supine manual hamstring, glut, piriformis stretch 30 sec   Forward and backward monster walk red band 20ftx2  Reclining cow face leg stretch 30 sec 2x each LE  SL SL shuttle leg press 37 lbs  LLE 25lbs RLE10x each THERE EX: 38min  Warm up Nu step 12 mins level 3 Les only  Gastroc stretch incline board 30 sec 3x  Manual IP/quad and ITB stretch SL each LE 30 sec 3x   Clams 15x each LE  Green band hip ABD bridge 10x2  Supine manual hamstring, glut, piriformis 2 way stretch 30 sec   Reclining cow face leg stretch 30 sec 2x each LE  Forward and backward monster walk red band 20ftx2  Red band glut med hip EXT 10x      MANUAL:  STM piriformis, glut med and lateral to SIJ 10 mins R LE effleurage  MANUAL:  STM piriformis, glut med, post greater troch  15 mins R LE effleurage                     HEP:  Access Code: [email or web address]  URL: [email or web address]  Date: 06/27/2024  Prepared by: Katie Oh     Exercises  - Sidelying Clamshell in Neutral  - 1 x daily - 7 x weekly - 3 sets - 10 reps  - Supine Figure 4 Piriformis Stretch with Leg Extension  - 1 x daily - 7 x weekly - 3 sets - 1 reps - 30 sec  hold  - Seated Table Piriformis Stretch  - 1 x daily - 7 x weekly - 3 sets - 1 reps - 30 sec  hold  - Quadruped Piriformis Stretch  - 1 x  daily - 7 x weekly - 3 sets - 1 reps - 30 sec hold  Reclining cow face legs each LE 30 sec 3x daily   - Hip Extension with Resistance Loop  - 4 x weekly - 3 sets - 10 reps    Charges: 3 ther ex 1 manual        Total Timed Treatment: 53 min  Total Treatment Time:55 min

## 2024-07-12 ENCOUNTER — OFFICE VISIT (OUTPATIENT)
Dept: PHYSICAL THERAPY | Age: 34
End: 2024-07-12
Attending: PHYSICIAN ASSISTANT
Payer: OTHER GOVERNMENT

## 2024-07-12 PROCEDURE — 97110 THERAPEUTIC EXERCISES: CPT

## 2024-07-12 PROCEDURE — 97140 MANUAL THERAPY 1/> REGIONS: CPT

## 2024-07-12 NOTE — PROGRESS NOTES
Diagnosis:   Chronic SI joint pain (M53.3,G89.29)  Trochanteric bursitis of both hips (M70.61,M70.62)      Referring Provider: Sugar Pickett  Date of Evaluation:    6/27/24    Precautions:  None Next MD visit:   none scheduled  Date of Surgery: n/a   Insurance Primary/Secondary:  / N/A     # Auth Visits: Medical Necessity            Subjective: Pt repors he was very sore over the last few days but felt much better after PT and relief has lasted, pressure and soreness into both hips where low back meets buttock.    Pain: 4/10      Objective:   7/8/24- TTP and hypertonic glut med and piriformis RLE  All of the below objective measures are from evaluation for reference:  AROM: (* denotes performed with pain)  Hip Knee   Flexion: R 101; L 100  Extension: R WFL; L WFL  Abduction: R 35; L 40  ER R 10; L 12  IR: R 35; L 33  Flexion: R WNL; L WNL  Extension: R WNL; L WNL            Assessment:Additions of free motion walks outs, tolerated without co, min cues for squat, STEP 360 mod SL squat mod cues for reduced rotation of femur.      Goals:    (to be met in 8 visits)  Pt will have improved hip AROM ER to > 14 deg to be able to don/doff shoes and perform car transfers without difficulty   Pt will improve hip ABD and ER strength to 5/5 to increase ease with standing and walking   Pt will be able to squat to  light objects around the house with <0/10 hip pain   Pt will improve functional hip strength to report ability to ascend/descend 1 flight of stairs reciprocally without use of handrail   Pt will demonstrate improved SLS to >50 seconds BRITTANY to promote safety and decrease risk of falls on uneven surfaces such as grass and gravel   Pt will report return to running without symptom increase before 1 mile allen to facilitate return to exercise   Pt will be independent and compliant with comprehensive HEP to maintain progress achieved in PT    Plan: Add banded squats, lateral walk outs, US potentially if no  improvement in pain. Focus ITB length, Rotator length and glut strength to reduce femoral rotation with knee flexion. Add squats to HEP next visit.  Date: 7/1/2024  TX#: 2/8 Date:  7/8/24               TX#: 3/8 Date: 7/12/24                TX#: 4/8 Date:                 TX#: 5/ Date:   Tx#: 6/   THERE EX: 35 min  Warm up Nu step 6 mins level 3 Les only  Gastroc stretch in galicia board 30 sec 3x  Manual IP/quad and ITB stretch SL each LE 30 sec 3x   Clams 15x each LE  Supine manual hamstring, glut, piriformis stretch 30 sec   Forward and backward monster walk red band 20ftx2  Reclining cow face leg stretch 30 sec 2x each LE  SL SL shuttle leg press 37 lbs  LLE 25lbs RLE10x each THERE EX: 38min  Warm up Nu step 12 mins level 3 Les only  Gastroc stretch incline board 30 sec 3x  Manual IP/quad and ITB stretch SL each LE 30 sec 3x   Clams 15x each LE  Green band hip ABD bridge 10x2  Supine manual hamstring, glut, piriformis 2 way stretch 30 sec   Reclining cow face leg stretch 30 sec 2x each LE  Forward and backward monster walk red band 20ftx2  Red band glut med hip EXT 10x THERE EX: 38min  Warm up Nu step 12 mins level 3 Les only  Gastroc stretch incline board 30 sec 3x  Manual IP/quad and ITB stretch SL each LE 30 sec 3x   Clams 20x each LE  Green band hip ABD bridge 10x2  Supine manual hamstring, glut, piriformis 2 way stretch 30 sec   Reclining cow face leg stretch 30 sec 2x each LE  Red band glut med hip EXT 10x2 each LE   Free motion backward and lateral walk outs 8x each belt 23lbs     MANUAL:  STM piriformis, glut med and lateral to SIJ 10 mins R LE effleurage  MANUAL:  STM piriformis, glut med, post greater troch  15 mins R LE effleurage  MANUAL:  STM piriformis, glut med, post greater troch  10 mins R LE effleurage to deep pressure       NEURO RE ED:  Mod SLS squat on STEP 360 10x each            HEP:  Access Code: [email or web address]  URL: [email or web address]  Date: 06/27/2024  Prepared by: Katie Wilson  Adriano     Exercises  - Sidelying Clamshell in Neutral  - 1 x daily - 7 x weekly - 3 sets - 10 reps  - Supine Figure 4 Piriformis Stretch with Leg Extension  - 1 x daily - 7 x weekly - 3 sets - 1 reps - 30 sec  hold  - Seated Table Piriformis Stretch  - 1 x daily - 7 x weekly - 3 sets - 1 reps - 30 sec  hold  - Quadruped Piriformis Stretch  - 1 x daily - 7 x weekly - 3 sets - 1 reps - 30 sec hold  Reclining cow face legs each LE 30 sec 3x daily   - Hip Extension with Resistance Loop  - 4 x weekly - 3 sets - 10 reps    Charges: 3 ther ex 1 manual        Total Timed Treatment: 48 min  Total Treatment Time:48 min

## 2024-07-16 ENCOUNTER — OFFICE VISIT (OUTPATIENT)
Dept: PHYSICAL THERAPY | Age: 34
End: 2024-07-16
Attending: PHYSICIAN ASSISTANT
Payer: OTHER GOVERNMENT

## 2024-07-16 PROCEDURE — 97112 NEUROMUSCULAR REEDUCATION: CPT

## 2024-07-16 PROCEDURE — 97110 THERAPEUTIC EXERCISES: CPT

## 2024-07-16 PROCEDURE — 97140 MANUAL THERAPY 1/> REGIONS: CPT

## 2024-07-16 NOTE — PROGRESS NOTES
Diagnosis:   Chronic SI joint pain (M53.3,G89.29)  Trochanteric bursitis of both hips (M70.61,M70.62)      Referring Provider: Sugar Pickett  Date of Evaluation:    6/27/24    Precautions:  None Next MD visit:   none scheduled  Date of Surgery: n/a   Insurance Primary/Secondary:  / N/A     # Auth Visits: Medical Necessity            Subjective: Pt reports he is feeling pretty good today, no pain reported in hips with warm up jog. He states pain is not so bad just tight.He reports he notices more R knee pain than hip currently.     Pain: 1/10      Objective:     7/8/24- TTP and hypertonic glut med and piriformis RLE  All of the below objective measures are from evaluation for reference:  AROM: (* denotes performed with pain)  Hip Knee   Flexion: R 101; L 100  Extension: R WFL; L WFL  Abduction: R 35; L 40  ER R 10; L 12  IR: R 35; L 33  Flexion: R WNL; L WNL  Extension: R WNL; L WNL            Assessment: Increased reps on clams and walk outs. PT educates pt on return to running program with focus on self stretch. Added ITB stretch with strap. Potential d/c next visit barring symptom increase with return to running program.      Goals:    (to be met in 8 visits)  Pt will have improved hip AROM ER to > 14 deg to be able to don/doff shoes and perform car transfers without difficulty   Pt will improve hip ABD and ER strength to 5/5 to increase ease with standing and walking   Pt will be able to squat to  light objects around the house with <0/10 hip pain   Pt will improve functional hip strength to report ability to ascend/descend 1 flight of stairs reciprocally without use of handrail   Pt will demonstrate improved SLS to >50 seconds BRITTANY to promote safety and decrease risk of falls on uneven surfaces such as grass and gravel   Pt will report return to running without symptom increase before 1 mile allen to facilitate return to exercise   Pt will be independent and compliant with comprehensive HEP to  maintain progress achieved in PT    Plan: Focus ITB length, Rotator length and glut strength to reduce femoral rotation with knee flexion. d/c next visit pending change in symptoms and return to running program.  Date: 7/1/2024  TX#: 2/8 Date:  7/8/24               TX#: 3/8 Date: 7/12/24                TX#: 4/8 Date: 7/16/24                TX#: 5/8 Date:   Tx#: 6/   THERE EX: 35 min  Warm up Nu step 6 mins level 3 Les only  Gastroc stretch in [place] board 30 sec 3x  Manual IP/quad and ITB stretch SL each LE 30 sec 3x   Clams 15x each LE  Supine manual hamstring, glut, piriformis stretch 30 sec   Forward and backward monster walk red band 20ftx2  Reclining cow face leg stretch 30 sec 2x each LE  SL SL shuttle leg press 37 lbs  LLE 25lbs RLE10x each THERE EX: 38min  Warm up Nu step 12 mins level 3 Les only  Gastroc stretch incline board 30 sec 3x  Manual IP/quad and ITB stretch SL each LE 30 sec 3x   Clams 15x each LE  Green band hip ABD bridge 10x2  Supine manual hamstring, glut, piriformis 2 way stretch 30 sec   Reclining cow face leg stretch 30 sec 2x each LE  Forward and backward monster walk red band 20ftx2  Red band glut med hip EXT 10x THERE EX: 38min  Warm up Nu step 12 mins level 3 Les only  Gastroc stretch incline board 30 sec 3x  Manual IP/quad and ITB stretch SL each LE 30 sec 3x   Clams 20x each LE  Green band hip ABD bridge 10x2  Supine manual hamstring, glut, piriformis 2 way stretch 30 sec   Reclining cow face leg stretch 30 sec 2x each LE  Red band glut med hip EXT 10x2 each LE   Free motion backward and lateral walk outs 8x each belt 23lbs THERE EX: 29min  Treadmill warm up to jog 15 mins  Gastroc stretch incline board 30 sec 3x level 1  Manual IP/quad and ITB stretch SL each LE 30 sec 3x   Clams 30x each LE  Green band hip ABD bridge 10x2  Supine manual hamstring, glut, piriformis 2 way stretch 30 sec   Reclining cow face leg stretch 30 sec 2x each LE  Free motion backward and lateral walk outs 10x  each belt 23lbs    MANUAL:  STM piriformis, glut med and lateral to SIJ 10 mins R LE effleurage  MANUAL:  STM piriformis, glut med, post greater troch  15 mins R LE effleurage  MANUAL:  STM piriformis, glut med, post greater troch  10 mins R LE effleurage to deep pressure MANUAL:  STM piriformis, glut med, post greater troch  8 mins R LE effleurage to deep pressure      NEURO RE ED:  Mod SLS squat on STEP 360 10x each NEURO RE ED: 8 min  Mod SLS squat on STEP 360 10x each  Star taps with cross over 5x eahc LE           HEP:  Access Code: [email or web address]  URL: [email or web address]  Date: 07/16/2024  Prepared by: Katie Oh    Exercises  - Sidelying Clamshell in Neutral  - 1 x daily - 7 x weekly - 3 sets - 10 reps  - Supine Figure 4 Piriformis Stretch with Leg Extension  - 1 x daily - 7 x weekly - 3 sets - 1 reps - 30 sec  hold  - Supine ITB Stretch with Strap  - 1 x daily - 7 x weekly - 3 sets - 1 reps - 30 sec  hold  - Seated Table Piriformis Stretch  - 1 x daily - 7 x weekly - 3 sets - 1 reps - 30 sec  hold  - Quadruped Piriformis Stretch  - 1 x daily - 7 x weekly - 3 sets - 1 reps - 30 sec hold  - Forward Monster Walks  - 4 x weekly - 3 sets - 10 reps  - Backward Monster Walks  - 4 x weekly - 3 sets - 10 reps  - Hip Extension with Resistance Loop  - 4 x weekly - 3 sets - 10 reps  - Squat  - 1 x daily - 4 x weekly - 3 sets - 10 reps    Charges: 2 ther ex 1 manual   1 neuro re ed     Total Timed Treatment: 45 min  Total Treatment Time:48 min

## 2024-07-19 ENCOUNTER — OFFICE VISIT (OUTPATIENT)
Dept: PHYSICAL THERAPY | Age: 34
End: 2024-07-19
Attending: PHYSICIAN ASSISTANT
Payer: OTHER GOVERNMENT

## 2024-07-19 PROCEDURE — 97140 MANUAL THERAPY 1/> REGIONS: CPT

## 2024-07-19 PROCEDURE — 97110 THERAPEUTIC EXERCISES: CPT

## 2024-07-19 PROCEDURE — 97035 APP MDLTY 1+ULTRASOUND EA 15: CPT

## 2024-07-19 NOTE — PROGRESS NOTES
Diagnosis:   Chronic SI joint pain (M53.3,G89.29)  Trochanteric bursitis of both hips (M70.61,M70.62)      Referring Provider: Sugar Pickett  Date of Evaluation:    6/27/24    Precautions:  None Next MD visit:   none scheduled  Date of Surgery: n/a   Insurance Primary/Secondary:  / N/A     # Auth Visits: Medical Necessity            Subjective: Pt reports he is feeling very tight in R glut and lateral hip today. He had to sit at work and feels more pain in buttock, different from initial lateral hip pain.    Pain: 5/10      Objective:     7/8/24- TTP and hypertonic glut med and piriformis RLE  All of the below objective measures are from evaluation for reference:  AROM: (* denotes performed with pain)  Hip Knee   Flexion: R 101; L 100  Extension: R WFL; L WFL  Abduction: R 35; L 40  ER R 10; L 12  IR: R 35; L 33  Flexion: R WNL; L WNL  Extension: R WNL; L WNL            Assessment: Added US given hypertonic glut and increased pain reports. Held walk outs and lateral walks given inflammation. Focus on soft tissue length in piriformis and glut. Pt able to complete squats without increased pain co.      Goals:    (to be met in 8 visits)  Pt will have improved hip AROM ER to > 14 deg to be able to don/doff shoes and perform car transfers without difficulty   Pt will improve hip ABD and ER strength to 5/5 to increase ease with standing and walking   Pt will be able to squat to  light objects around the house with <0/10 hip pain   Pt will improve functional hip strength to report ability to ascend/descend 1 flight of stairs reciprocally without use of handrail   Pt will demonstrate improved SLS to >50 seconds BRITTANY to promote safety and decrease risk of falls on uneven surfaces such as grass and gravel   Pt will report return to running without symptom increase before 1 mile [place]en to facilitate return to exercise   Pt will be independent and compliant with comprehensive HEP to maintain progress achieved in  PT    Plan:Plan to continue skilled PT to address pain, inflammation and tension in glut/piriformis  Date: 7/1/2024  TX#: 2/8 Date:  7/8/24               TX#: 3/8 Date: 7/12/24                TX#: 4/8 Date: 7/16/24                TX#: 5/8 Date: 7/19/24  Tx#: 6/8   THERE EX: 35 min  Warm up Nu step 6 mins level 3 Les only  Gastroc stretch in [place] board 30 sec 3x  Manual IP/quad and ITB stretch SL each LE 30 sec 3x   Clams 15x each LE  Supine manual hamstring, glut, piriformis stretch 30 sec   Forward and backward monster walk red band 20ftx2  Reclining cow face leg stretch 30 sec 2x each LE  SL SL shuttle leg press 37 lbs  LLE 25lbs RLE10x each THERE EX: 38min  Warm up Nu step 12 mins level 3 Les only  Gastroc stretch incline board 30 sec 3x  Manual IP/quad and ITB stretch SL each LE 30 sec 3x   Clams 15x each LE  Green band hip ABD bridge 10x2  Supine manual hamstring, glut, piriformis 2 way stretch 30 sec   Reclining cow face leg stretch 30 sec 2x each LE  Forward and backward monster walk red band 20ftx2  Red band glut med hip EXT 10x THERE EX: 38min  Warm up Nu step 12 mins level 3 Les only  Gastroc stretch incline board 30 sec 3x  Manual IP/quad and ITB stretch SL each LE 30 sec 3x   Clams 20x each LE  Green band hip ABD bridge 10x2  Supine manual hamstring, glut, piriformis 2 way stretch 30 sec   Reclining cow face leg stretch 30 sec 2x each LE  Red band glut med hip EXT 10x2 each LE   Free motion backward and lateral walk outs 8x each belt 23lbs THERE EX: 29min  Treadmill warm up to jog 15 mins  Gastroc stretch incline board 30 sec 3x level 1  Manual IP/quad and ITB stretch SL each LE 30 sec 3x   Clams 30x each LE  Green band hip ABD bridge 10x2  Supine manual hamstring, glut, piriformis 2 way stretch 30 sec   Reclining cow face leg stretch 30 sec 2x each LE  Free motion backward and lateral walk outs 10x each belt 23lbs THERE EX: 26 min  Warm up NU step level 5 8 min  Gastroc stretch incline board 30 sec 3x  level 2  Manual IP/quad and ITB stretch SL each LE 30 sec 3x   Clams 30x each LE  Self myofascial release piriformis on foam roll   Supine manual hamstring, glut, piriformis 2 way stretch 30 sec    MANUAL:  STM piriformis, glut med and lateral to SIJ 10 mins R LE effleurage  MANUAL:  STM piriformis, glut med, post greater troch  15 mins R LE effleurage  MANUAL:  STM piriformis, glut med, post greater troch  10 mins R LE effleurage to deep pressure MANUAL:  STM piriformis, glut med, post greater troch  8 mins R LE effleurage to deep pressure MANUAL:  STM piriformis, glut med, post greater troch  15 mins R LE effleurage to deep pressure     NEURO RE ED:  Mod SLS squat on STEP 360 10x each NEURO RE ED: 8 min  Mod SLS squat on STEP 360 10x each  Star taps with cross over 5x eahc LE NEURO RE ED:  4 min  Mod SLS squat on STEP 360 10x each       MODALITIES  US SL R glut, 8 mins frequency 1 MHz, head warming on, duty cycle 100% 1.5 W/cm2   HEP:  Access Code: [email or web address]  URL: [email or web address]  Date: 07/16/2024  Prepared by: Katie Oh    Exercises  - Sidelying Clamshell in Neutral  - 1 x daily - 7 x weekly - 3 sets - 10 reps  - Supine Figure 4 Piriformis Stretch with Leg Extension  - 1 x daily - 7 x weekly - 3 sets - 1 reps - 30 sec  hold  - Supine ITB Stretch with Strap  - 1 x daily - 7 x weekly - 3 sets - 1 reps - 30 sec  hold  - Seated Table Piriformis Stretch  - 1 x daily - 7 x weekly - 3 sets - 1 reps - 30 sec  hold  - Quadruped Piriformis Stretch  - 1 x daily - 7 x weekly - 3 sets - 1 reps - 30 sec hold  - Forward Monster Walks  - 4 x weekly - 3 sets - 10 reps  - Backward Monster Walks  - 4 x weekly - 3 sets - 10 reps  - Hip Extension with Resistance Loop  - 4 x weekly - 3 sets - 10 reps  - Squat  - 1 x daily - 4 x weekly - 3 sets - 10 reps    Charges:2 ther ex 1 manual    1 US  Total Timed Treatment: 49 min  Total Treatment Time:50 min

## 2024-08-01 ENCOUNTER — OFFICE VISIT (OUTPATIENT)
Dept: PHYSICAL THERAPY | Age: 34
End: 2024-08-01
Attending: PHYSICIAN ASSISTANT
Payer: OTHER GOVERNMENT

## 2024-08-01 PROCEDURE — 97112 NEUROMUSCULAR REEDUCATION: CPT

## 2024-08-01 PROCEDURE — 97110 THERAPEUTIC EXERCISES: CPT

## 2024-08-01 NOTE — PROGRESS NOTES
Diagnosis:   Chronic SI joint pain (M53.3,G89.29)  Trochanteric bursitis of both hips (M70.61,M70.62)      Referring Provider: Sugar Pickett  Date of Evaluation:    6/27/24    Precautions:  None Next MD visit:   none scheduled  Date of Surgery: n/a   Insurance Primary/Secondary:  / N/A     # Auth Visits: Medical Necessity            Subjective: Pt reports hip pain has improved since last visit, really bad for about 5 days, but was diligent with foam roller and stretches and it has dissipated.     Pain: 2/10      Objective:   8/1/24- SLS RLE 65 sec LLE 65 secs AROM R hip ER 31 deg IR 45 deg  LLE: 25 deg IR 55 deg  7/8/24- TTP and hypertonic glut med and piriformis RLE  All of the below objective measures are from evaluation for reference:  AROM: (* denotes performed with pain)  Hip Knee   Flexion: R 101; L 100  Extension: R WFL; L WFL  Abduction: R 35; L 40  ER R 10; L 12  IR: R 35; L 33  Flexion: R WNL; L WNL  Extension: R WNL; L WNL            Assessment: Held US 2/2 min pain. Added side plank with hip dip to challenge lateral hip strength. Pt with solid form on monster walks, engaging core without PT cueing. Increased AROM ER BLEs, pt progressing toward d/c.    Goals:    (to be met in 8 visits)  Pt will have improved hip AROM ER to > 14 deg to be able to don/doff shoes and perform car transfers without difficulty 8/1/24- GOAL MET  Pt will improve hip ABD and ER strength to 5/5 to increase ease with standing and walking   Pt will be able to squat to  light objects around the house with <0/10 hip pain 8/1/24- GOAL MET  Pt will improve functional hip strength to report ability to ascend/descend 1 flight of stairs reciprocally without use of handrail 8/1/24- GOAL MET  Pt will demonstrate improved SLS to >50 seconds BRITTANY to promote safety and decrease risk of falls on uneven surfaces such as grass and gravel 8/1/24- GOAL MET  Pt will report return to running without symptom increase before 1 mile [place]en to  facilitate return to exercise 8/1/24-  Pt will be independent and compliant with comprehensive HEP to maintain progress achieved in PT    Plan:Plan to continue skilled PT to address pain, inflammation and tension in glut/piriformis  Date: 7/1/2024  TX#: 2/8 Date:  7/8/24               TX#: 3/8 Date: 7/12/24                TX#: 4/8 Date: 7/16/24                TX#: 5/8 Date: 7/19/24  Tx#: 6/8 Date: 8/1/124  TX#: 7/8   THERE EX: 35 min  Warm up Nu step 6 mins level 3 Les only  Gastroc stretch in [place] board 30 sec 3x  Manual IP/quad and ITB stretch SL each LE 30 sec 3x   Clams 15x each LE  Supine manual hamstring, glut, piriformis stretch 30 sec   Forward and backward monster walk red band 20ftx2  Reclining cow face leg stretch 30 sec 2x each LE  SL SL shuttle leg press 37 lbs  LLE 25lbs RLE10x each THERE EX: 38min  Warm up Nu step 12 mins level 3 Les only  Gastroc stretch incline board 30 sec 3x  Manual IP/quad and ITB stretch SL each LE 30 sec 3x   Clams 15x each LE  Green band hip ABD bridge 10x2  Supine manual hamstring, glut, piriformis 2 way stretch 30 sec   Reclining cow face leg stretch 30 sec 2x each LE  Forward and backward monster walk red band 20ftx2  Red band glut med hip EXT 10x THERE EX: 38min  Warm up Nu step 12 mins level 3 Les only  Gastroc stretch incline board 30 sec 3x  Manual IP/quad and ITB stretch SL each LE 30 sec 3x   Clams 20x each LE  Green band hip ABD bridge 10x2  Supine manual hamstring, glut, piriformis 2 way stretch 30 sec   Reclining cow face leg stretch 30 sec 2x each LE  Red band glut med hip EXT 10x2 each LE   Free motion backward and lateral walk outs 8x each belt 23lbs THERE EX: 29min  Treadmill warm up to jog 15 mins  Gastroc stretch incline board 30 sec 3x level 1  Manual IP/quad and ITB stretch SL each LE 30 sec 3x   Clams 30x each LE  Green band hip ABD bridge 10x2  Supine manual hamstring, glut, piriformis 2 way stretch 30 sec   Reclining cow face leg stretch 30 sec 2x each  LE  Free motion backward and lateral walk outs 10x each belt 23lbs THERE EX: 26 min  Warm up NU step level 5 8 min  Gastroc stretch incline board 30 sec 3x level 2  Manual IP/quad and ITB stretch SL each LE 30 sec 3x   Clams 30x each LE  Self myofascial release piriformis on foam roll   Supine manual hamstring, glut, piriformis 2 way stretch 30 sec  THERE EX: 30 min  Warm up Treadmill to jog/run 15 mins total no pain reports  Gastroc stretch incline board 30 sec 3x level 2  Manual IP/quad and ITB stretch SL each LE 30 sec 3x   Green band forward/backward monster walk 20 ft  Side plank hip dip 10x2 each LE  Supine manual hamstring, glut, piriformis 2 way stretch 30 sec   Shuttle leg press 50lbs SL 20x each LE   MANUAL:  STM piriformis, glut med and lateral to SIJ 10 mins R LE effleurage  MANUAL:  STM piriformis, glut med, post greater troch  15 mins R LE effleurage  MANUAL:  STM piriformis, glut med, post greater troch  10 mins R LE effleurage to deep pressure MANUAL:  STM piriformis, glut med, post greater troch  8 mins R LE effleurage to deep pressure MANUAL:  STM piriformis, glut med, post greater troch  15 mins R LE effleurage to deep pressure      NEURO RE ED:  Mod SLS squat on STEP 360 10x each NEURO RE ED: 8 min  Mod SLS squat on STEP 360 10x each  Star taps with cross over 5x eahc LE NEURO RE ED:  4 min  Mod SLS squat on STEP 360 10x each NEURO RE ED: 12min  Mod SLS squat on STEP 360 10x each  Star taps stance on air ex with cross over 10x eahc LE  Step hop over STEP 360 10x eahc LE to mod single leg squat  Partial squat 20x on BOSU       MODALITIES  US SL R glut, 8 mins frequency 1 MHz, head warming on, duty cycle 100% 1.5 W/cm2    HEP:  Access Code: [email or web address]  URL: [email or web address]  Date: 07/16/2024  Prepared by: Katie Oh    Exercises  - Sidelying Clamshell in Neutral  - 1 x daily - 7 x weekly - 3 sets - 10 reps  - Supine Figure 4 Piriformis Stretch with Leg Extension  - 1 x  daily - 7 x weekly - 3 sets - 1 reps - 30 sec  hold  - Supine ITB Stretch with Strap  - 1 x daily - 7 x weekly - 3 sets - 1 reps - 30 sec  hold  - Seated Table Piriformis Stretch  - 1 x daily - 7 x weekly - 3 sets - 1 reps - 30 sec  hold  - Quadruped Piriformis Stretch  - 1 x daily - 7 x weekly - 3 sets - 1 reps - 30 sec hold  - Forward Monster Walks  - 4 x weekly - 3 sets - 10 reps  - Backward Monster Walks  - 4 x weekly - 3 sets - 10 reps  - Hip Extension with Resistance Loop  - 4 x weekly - 3 sets - 10 reps  - Squat  - 1 x daily - 4 x weekly - 3 sets - 10 reps    Charges:2 ther ex 1 neuro re ed   Total Timed Treatment: 42 min  Total Treatment Time:45 min

## 2024-08-05 ENCOUNTER — OFFICE VISIT (OUTPATIENT)
Dept: PHYSICAL THERAPY | Age: 34
End: 2024-08-05
Attending: PHYSICIAN ASSISTANT
Payer: OTHER GOVERNMENT

## 2024-08-05 PROCEDURE — 97112 NEUROMUSCULAR REEDUCATION: CPT

## 2024-08-05 PROCEDURE — 97110 THERAPEUTIC EXERCISES: CPT

## 2024-08-05 NOTE — PROGRESS NOTES
Diagnosis:   Chronic SI joint pain (M53.3,G89.29)  Trochanteric bursitis of both hips (M70.61,M70.62)      Referring Provider: Sugar Pickett  Date of Evaluation:    6/27/24    Precautions:  None Next MD visit:   none scheduled  Date of Surgery: n/a   Insurance Primary/Secondary:  / N/A     # Auth Visits: Medical Necessity           Discharge Summary  Pt has attended 8 visits in Physical Therapy.     Subjective: Pt reports he had no increased pain with being at drill for the [employer] this weekend. Feeling pretty good and ready for d/c. He states last night he ran a mile and walked a mile and half. Hip tightness but no pain.    Pain: 0/10      Objective:   8/1/24- SLS RLE 65 sec LLE 65 secs AROM R hip ER 31 deg IR 45 deg  LLE: 25 deg IR 55 deg  7/8/24- TTP and hypertonic glut med and piriformis RLE  All of the below objective measures are from evaluation for reference:  AROM: (* denotes performed with pain)  Hip Knee   Flexion: R 101; L 100  Extension: R WFL; L WFL  Abduction: R 35; L 40  ER R 10; L 12  IR: R 35; L 33  Flexion: R WNL; L WNL  Extension: R WNL; L WNL            Assessment: Pt to be d/c from skilled PT 2/2 meeting all goals. Pt has made gains in ROM and strength as evidenced by increased rotation ER from 10 deg at IE to 31 deg at d/c and IR from 35 to 45 deg. Pt has improved pelvic stability as evidenced by increased SLS time to > 1 in each LE. Pt has improved functional strength and gait efficiency as evidenced by ability to return to running without increased symptoms. LEFS score improvement of 13.75% by discharge noted. Pt has HEP to maintain/progress strength, ROM  independently.       Goals:    (to be met in 8 visits)  Pt will have improved hip AROM ER to > 14 deg to be able to don/doff shoes and perform car transfers without difficulty 8/1/24- GOAL MET  Pt will improve hip ABD and ER strength to 5/5 to increase ease with standing and walking 8/5/24- GOAL MET  Pt will be able to squat to   light objects around the house with <0/10 hip pain 8/1/24- GOAL MET  Pt will improve functional hip strength to report ability to ascend/descend 1 flight of stairs reciprocally without use of handrail 8/1/24- GOAL MET  Pt will demonstrate improved SLS to >50 seconds BRITTANY to promote safety and decrease risk of falls on uneven surfaces such as grass and gravel 8/1/24- GOAL MET  Pt will report return to running without symptom increase before 1 mile [place]en to facilitate return to exercise 8/5/24- GOAL MET  Pt will be independent and compliant with comprehensive HEP to maintain progress achieved in PT 8/5/24- GOAL MET    Plan:D/C  Date: 7/12/24                TX#: 4/8 Date: 7/16/24                TX#: 5/8 Date: 7/19/24  Tx#: 6/8 Date: 8/1/124  TX#: 7/8 Date: 8/5/24  TX#: 8/8   THERE EX: 38min  Warm up Nu step 12 mins level 3 Les only  Gastroc stretch incline board 30 sec 3x  Manual IP/quad and ITB stretch SL each LE 30 sec 3x   Clams 20x each LE  Green band hip ABD bridge 10x2  Supine manual hamstring, glut, piriformis 2 way stretch 30 sec   Reclining cow face leg stretch 30 sec 2x each LE  Red band glut med hip EXT 10x2 each LE   Free motion backward and lateral walk outs 8x each belt 23lbs THERE EX: 29min  Treadmill warm up to jog 15 mins  Gastroc stretch incline board 30 sec 3x level 1  Manual IP/quad and ITB stretch SL each LE 30 sec 3x   Clams 30x each LE  Green band hip ABD bridge 10x2  Supine manual hamstring, glut, piriformis 2 way stretch 30 sec   Reclining cow face leg stretch 30 sec 2x each LE  Free motion backward and lateral walk outs 10x each belt 23lbs THERE EX: 26 min  Warm up NU step level 5 8 min  Gastroc stretch incline board 30 sec 3x level 2  Manual IP/quad and ITB stretch SL each LE 30 sec 3x   Clams 30x each LE  Self myofascial release piriformis on foam roll   Supine manual hamstring, glut, piriformis 2 way stretch 30 sec  THERE EX: 30 min  Warm up Treadmill to jog/run 15 mins total no pain  reports  Gastroc stretch incline board 30 sec 3x level 2  Manual IP/quad and ITB stretch SL each LE 30 sec 3x   Green band forward/backward monster walk 20 ft  Side plank hip dip 10x2 each LE  Supine manual hamstring, glut, piriformis 2 way stretch 30 sec   Shuttle leg press 50lbs SL 20x each LE THERE EX: 30 min  Warm up Treadmill to jog/run 12 mins total no pain reports (speed 7.0)  Gastroc stretch incline board 30 sec 3x level 2  Manual IP/quad and ITB stretch SL each LE 30 sec 3x   Green band forward/backward monster walk 20 ft  Side plank hip dip 10x2 each LE  Supine manual hamstring, glut, piriformis 2 way stretch 30 sec   Shuttle leg press 50lbs SL 20x each LE  HEP review   MANUAL:  STM piriformis, glut med, post greater troch  10 mins R LE effleurage to deep pressure MANUAL:  STM piriformis, glut med, post greater troch  8 mins R LE effleurage to deep pressure MANUAL:  STM piriformis, glut med, post greater troch  15 mins R LE effleurage to deep pressure     NEURO RE ED:  Mod SLS squat on STEP 360 10x each NEURO RE ED: 8 min  Mod SLS squat on STEP 360 10x each  Star taps with cross over 5x eahc LE NEURO RE ED:  4 min  Mod SLS squat on STEP 360 10x each NEURO RE ED: 12min  Mod SLS squat on STEP 360 10x each  Star taps stance on air ex with cross over 10x eahc LE  Step hop over STEP 360 10x eahc LE to mod single leg squat  Partial squat 20x on BOSU NEURO RE ED: 12min  Mod SLS squat on STEP 360 10x each  Star taps stance on air ex with cones cross over 10x eahc LE  Step hop over STEP 360 10x eahc LE to mod single leg squat  Partial squat 20x on BOSU    Rebounder??     MODALITIES  US SL R glut, 8 mins frequency 1 MHz, head warming on, duty cycle 100% 1.5 W/cm2     HEP:  Access Code: [email or web address]  URL: [email or web address]  Date: 07/16/2024  Prepared by: Katie Oh    Exercises  - Sidelying Clamshell in Neutral  - 1 x daily - 7 x weekly - 3 sets - 10 reps  - Supine Figure 4 Piriformis Stretch  with Leg Extension  - 1 x daily - 7 x weekly - 3 sets - 1 reps - 30 sec  hold  - Supine ITB Stretch with Strap  - 1 x daily - 7 x weekly - 3 sets - 1 reps - 30 sec  hold  - Seated Table Piriformis Stretch  - 1 x daily - 7 x weekly - 3 sets - 1 reps - 30 sec  hold  - Quadruped Piriformis Stretch  - 1 x daily - 7 x weekly - 3 sets - 1 reps - 30 sec hold  - Forward Monster Walks  - 4 x weekly - 3 sets - 10 reps  - Backward Monster Walks  - 4 x weekly - 3 sets - 10 reps  - Hip Extension with Resistance Loop  - 4 x weekly - 3 sets - 10 reps  - Squat  - 1 x daily - 4 x weekly - 3 sets - 10 reps    Charges:2 ther ex 1 neuro re ed   Total Timed Treatment: 42 min  Total Treatment Time:45 min  LEFS Score  LEFS Score: 83.75 % (6/20/2024  9:55 AM)    Post LEFS Score  Post LEFS Score: 97.5 % (8/5/2024  9:20 AM)    13.75 % improvement    Plan: Discontinue skilled Physical Therapy     Patient/Family/Caregiver was advised of these findings, precautions, and treatment options and has agreed to actively participate in planning and for this course of care.    Thank you for your referral. If you have any questions, please contact me at Dept: [phone].    Sincerely,  Electronically signed by therapist: Vanita Oh, PT, DPT    Physician's certification required:  Yes  Please co-sign or sign and return this letter via fax as soon as possible to [phone].   I certify the need for these services furnished under this plan of treatment and while under my care.    X___________________________________________________ Date____________________    Certification From: 8/5/2024  To:11/3/2024

## 2024-10-01 ENCOUNTER — OFFICE VISIT (OUTPATIENT)
Dept: FAMILY MEDICINE CLINIC | Facility: CLINIC | Age: 34
End: 2024-10-01
Payer: OTHER GOVERNMENT

## 2024-10-01 VITALS
OXYGEN SATURATION: 98 % | DIASTOLIC BLOOD PRESSURE: 70 MMHG | SYSTOLIC BLOOD PRESSURE: 116 MMHG | RESPIRATION RATE: 16 BRPM | WEIGHT: 205 LBS | HEIGHT: 69 IN | BODY MASS INDEX: 30.36 KG/M2 | HEART RATE: 60 BPM | TEMPERATURE: 98 F

## 2024-10-01 DIAGNOSIS — M10.072 ACUTE IDIOPATHIC GOUT OF LEFT FOOT: Primary | ICD-10-CM

## 2024-10-01 PROCEDURE — 99213 OFFICE O/P EST LOW 20 MIN: CPT | Performed by: FAMILY MEDICINE

## 2024-10-01 RX ORDER — INDOMETHACIN 50 MG/1
50 CAPSULE ORAL
Qty: 30 CAPSULE | Refills: 1 | Status: SHIPPED | OUTPATIENT
Start: 2024-10-01

## 2024-10-01 NOTE — PROGRESS NOTES
Subjective:   Patient ID: Rojas Mckeon is a 34 year old male.    Gout    Mid foot pain yest evening.w/o trauma  Hx gout L ankle  W/o swelling      History/Other:   Review of Systems   Musculoskeletal:  Positive for gout.     Current Outpatient Medications   Medication Sig Dispense Refill    indomethacin 50 MG Oral Cap Take 1 capsule (50 mg total) by mouth 3 (three) times daily with meals. 30 capsule 1    topiramate 50 MG Oral Tab Take 1 tablet (50 mg total) by mouth 2 (two) times daily. 180 tablet 3    Rizatriptan Benzoate 10 MG Oral Tab Take 1 tablet (10 mg total) by mouth as needed for Migraine. 12 tablet 5    clobetasol 0.05 % External Cream Apply bid x 10 days post arms 45 g 1     Allergies:No Known Allergies    Objective:   Physical Exam  Vitals reviewed.   Constitutional:       Appearance: Normal appearance.   Cardiovascular:      Rate and Rhythm: Normal rate and regular rhythm.      Pulses: Normal pulses.      Heart sounds: Normal heart sounds.   Pulmonary:      Effort: Pulmonary effort is normal.      Breath sounds: Normal breath sounds.   Musculoskeletal:      Cervical back: Neck supple.   Skin:     General: Skin is warm and dry.      Findings: No erythema.   Neurological:      Mental Status: He is alert.       /70   Pulse 60   Temp 97.6 °F (36.4 °C) (Temporal)   Resp 16   Ht 5' 9\" (1.753 m)   Wt 205 lb (93 kg)   SpO2 98%   BMI 30.27 kg/m²     Assessment & Plan:   1. Acute idiopathic gout of left foot        No orders of the defined types were placed in this encounter.      Meds This Visit:  Requested Prescriptions     Signed Prescriptions Disp Refills    indomethacin 50 MG Oral Cap 30 capsule 1     Sig: Take 1 capsule (50 mg total) by mouth 3 (three) times daily with meals.       Imaging & Referrals:  None

## 2024-11-27 ENCOUNTER — HOSPITAL ENCOUNTER (OUTPATIENT)
Age: 34
Discharge: HOME | End: 2024-11-27
Payer: OTHER GOVERNMENT

## 2024-11-27 ENCOUNTER — APPOINTMENT (OUTPATIENT)
Dept: GENERAL RADIOLOGY | Age: 34
End: 2024-11-27
Attending: NURSE PRACTITIONER
Payer: OTHER GOVERNMENT

## 2024-11-27 VITALS
SYSTOLIC BLOOD PRESSURE: 108 MMHG | WEIGHT: 205 LBS | HEIGHT: 69 IN | BODY MASS INDEX: 30.36 KG/M2 | HEART RATE: 84 BPM | DIASTOLIC BLOOD PRESSURE: 79 MMHG | RESPIRATION RATE: 16 BRPM | OXYGEN SATURATION: 100 % | TEMPERATURE: 98 F

## 2024-11-27 DIAGNOSIS — R05.1 ACUTE COUGH: ICD-10-CM

## 2024-11-27 DIAGNOSIS — B34.9 VIRAL ILLNESS: ICD-10-CM

## 2024-11-27 DIAGNOSIS — R06.00 DYSPNEA, UNSPECIFIED TYPE: Primary | ICD-10-CM

## 2024-11-27 PROCEDURE — 99213 OFFICE O/P EST LOW 20 MIN: CPT | Performed by: NURSE PRACTITIONER

## 2024-11-27 PROCEDURE — 71046 X-RAY EXAM CHEST 2 VIEWS: CPT | Performed by: NURSE PRACTITIONER

## 2024-11-27 NOTE — ED PROVIDER NOTES
Patient Seen in: [facility]      History     Chief Complaint   Patient presents with    Cough/URI     Stated Complaint: cough    Subjective:   HPI      34-year-old male presents today with complaints of a cough x 1 week.  Patient states the last several days he has become short of breath with the cough.  Patient states he is using over-the-counter Delsym with little relief.    Objective:     Past Medical History:    Gout    Migraine    Migraines              History reviewed. No pertinent surgical history.             No pertinent social history.            Review of Systems   Constitutional: Negative.    HENT: Negative.     Eyes: Negative.    Respiratory:  Positive for cough and shortness of breath.    Cardiovascular: Negative.    Gastrointestinal: Negative.    Endocrine: Negative.    Genitourinary: Negative.    Musculoskeletal: Negative.    Skin: Negative.    Allergic/Immunologic: Negative.    Neurological: Negative.    Hematological: Negative.    Psychiatric/Behavioral: Negative.         Positive for stated complaint: cough  Other systems are as noted in HPI.  Constitutional and vital signs reviewed.      All other systems reviewed and negative except as noted above.    Physical Exam     ED Triage Vitals [11/27/24 0817]   /79   Pulse 84   Resp 16   Temp 98.4 °F (36.9 °C)   Temp src Oral   SpO2 100 %   O2 Device None (Room air)       Current Vitals:   Vital Signs  BP: 108/79  Pulse: 84  Resp: 16  Temp: 98.4 °F (36.9 °C)  Temp src: Oral    Oxygen Therapy  SpO2: 100 %  O2 Device: None (Room air)        Physical Exam  Vitals and nursing note reviewed.   Constitutional:       Appearance: Normal appearance. He is normal weight.   HENT:      Head: Normocephalic.      Right Ear: Tympanic membrane, ear canal and external ear normal.      Left Ear: Tympanic membrane, ear canal and external ear normal.      Nose: Nose normal.      Mouth/Throat:      Mouth: Mucous membranes are moist.      Pharynx:  Oropharynx is clear.   Eyes:      Extraocular Movements: Extraocular movements intact.      Conjunctiva/sclera: Conjunctivae normal.      Pupils: Pupils are equal, round, and reactive to light.   Cardiovascular:      Rate and Rhythm: Normal rate and regular rhythm.      Pulses: Normal pulses.      Heart sounds: Normal heart sounds.   Pulmonary:      Effort: Pulmonary effort is normal.      Breath sounds: Normal breath sounds.   Musculoskeletal:      Cervical back: Normal range of motion and neck supple.   Skin:     General: Skin is warm and dry.   Neurological:      General: No focal deficit present.      Mental Status: He is alert.   Psychiatric:         Mood and Affect: Mood normal.           ED Course   Labs Reviewed - No data to display                MDM      34-year-old male presents today with complaints of a cough x 1 week.  Patient states the last several days he has become short of breath with the cough.  Patient states he is using over-the-counter Delsym with little relief.  Vital signs: Please see EMR.  Physical exam: Please see exam.  Differential diagnosis: Pneumonia, bronchitis, URI.  XR CHEST PA + LAT CHEST (CPT=71046)    Result Date: 11/27/2024  CONCLUSION:  No consolidation.   LOCATION:  [facility]   Dictated by (CST): Marko Armstrong MD on 11/27/2024 at 8:51 AM     Finalized by (CST): Marko Armstrong MD on 11/27/2024 at 8:52 AM      I have personally reviewed the x-ray of the chest and agree no acute abnormalities noted on the x-ray.  Will diagnose patient with viral illness.  Patient instructed to treat his symptoms supportively.        Medical Decision Making  34-year-old male presents today with complaints of a cough x 1 week.  Patient states the last several days he has become short of breath with the cough.  Patient states he is using over-the-counter Delsym with little relief.    Problems Addressed:  Acute cough: acute illness or injury  Dyspnea, unspecified type: acute illness or  injury  Viral illness: acute illness or injury    Amount and/or Complexity of Data Reviewed  Radiology: ordered. Decision-making details documented in ED Course.    Risk  OTC drugs.        Disposition and Plan     Clinical Impression:  1. Dyspnea, unspecified type    2. Acute cough    3. Viral illness         Disposition:  Discharge  11/27/2024  8:57 am    Follow-up:  Dean Hsu DO  [address] [facility] [place] [facility] [address]  [phone]    In 1 week            Medications Prescribed:  Current Discharge Medication List              Supplementary Documentation:

## 2024-11-27 NOTE — DISCHARGE INSTRUCTIONS
Increase water intake 2-3 liters daily   Your dose of ibuprofen is 600 mg every 6 hours for pain or fevers as needed.        Your dose of acetaminophen (Tylenol) is 1000 mg every 4 hours for pain or fevers as needed.     If you develop any worsening shortness of breath please go to the ER for evaluation.

## 2025-02-21 ENCOUNTER — OFFICE VISIT (OUTPATIENT)
Dept: FAMILY MEDICINE CLINIC | Facility: CLINIC | Age: 35
End: 2025-02-21
Payer: OTHER GOVERNMENT

## 2025-02-21 VITALS
BODY MASS INDEX: 29.62 KG/M2 | TEMPERATURE: 97 F | HEIGHT: 69 IN | SYSTOLIC BLOOD PRESSURE: 114 MMHG | HEART RATE: 76 BPM | OXYGEN SATURATION: 99 % | RESPIRATION RATE: 18 BRPM | WEIGHT: 200 LBS | DIASTOLIC BLOOD PRESSURE: 72 MMHG

## 2025-02-21 DIAGNOSIS — J02.9 SORE THROAT: ICD-10-CM

## 2025-02-21 DIAGNOSIS — J06.9 VIRAL URI: Primary | ICD-10-CM

## 2025-02-21 DIAGNOSIS — Z20.818 EXPOSURE TO STREP THROAT: ICD-10-CM

## 2025-02-21 LAB
CONTROL LINE PRESENT WITH A CLEAR BACKGROUND (YES/NO): YES YES/NO
KIT LOT #: NORMAL NUMERIC
STREP GRP A CUL-SCR: NEGATIVE

## 2025-03-17 ENCOUNTER — OFFICE VISIT (OUTPATIENT)
Dept: FAMILY MEDICINE CLINIC | Facility: CLINIC | Age: 35
End: 2025-03-17
Payer: OTHER GOVERNMENT

## 2025-03-17 VITALS
BODY MASS INDEX: 29.62 KG/M2 | HEIGHT: 69 IN | OXYGEN SATURATION: 99 % | TEMPERATURE: 98 F | HEART RATE: 90 BPM | WEIGHT: 200 LBS | RESPIRATION RATE: 18 BRPM | DIASTOLIC BLOOD PRESSURE: 78 MMHG | SYSTOLIC BLOOD PRESSURE: 114 MMHG

## 2025-03-17 DIAGNOSIS — J02.9 SORE THROAT: ICD-10-CM

## 2025-03-17 DIAGNOSIS — J01.00 ACUTE NON-RECURRENT MAXILLARY SINUSITIS: Primary | ICD-10-CM

## 2025-03-17 PROCEDURE — 99213 OFFICE O/P EST LOW 20 MIN: CPT | Performed by: PHYSICIAN ASSISTANT

## 2025-03-17 NOTE — PATIENT INSTRUCTIONS
Augmentin 875 mg twice daily for 10 days.    Encourage fluids, humidifier/vaporizor at bedside, elevate head of bed (sleep with extra pillow), vapor rub to chest, steam therapy if no fever, warm compresses for sinus pressure if no fever, salt water gargles for sore throat, lozenges for sore throat, may try over the counter saline nasal spray or irrigation kit (use distilled water with irrigation kit) for sinus pressure/congestion, get plenty of rest.    Follow up with your primary care provider if your symptoms fail to improve and resolve as anticipated    Go to the Immediate Care or Emergency Department in event of new or worsening symptoms at any time

## 2025-03-17 NOTE — PROGRESS NOTES
CHIEF COMPLAINT:     Chief Complaint   Patient presents with    Sore Throat     Started 2-3 sinus pressure congestion          HPI:   Rojas Mckeon is a 34 year old male who presents for upper respiratory symptoms for  2 weeks. Patient reports congestion, green/yellow colored nasal discharge, cough with purulent colored sputum, ear pain, sinus pain, OTC cold meds have not been helping, no chills/sweats, denies fever. Cough only occurs in mornings after waking.   Denies fever, no CP, no SOB/HINSON, no n/v/d, no lh/dizziness   OTC Sudafed, Mucinex, and flonase without relief.   No h/o sinus surgery.     Current Outpatient Medications   Medication Sig Dispense Refill    amoxicillin clavulanate 875-125 MG Oral Tab Take 1 tablet by mouth 2 (two) times daily for 10 days. 20 tablet 0    topiramate 50 MG Oral Tab Take 1 tablet (50 mg total) by mouth 2 (two) times daily. 180 tablet 3    Rizatriptan Benzoate 10 MG Oral Tab Take 1 tablet (10 mg total) by mouth as needed for Migraine. 12 tablet 5    clobetasol 0.05 % External Cream Apply bid x 10 days post arms 45 g 1      Past Medical History:    Gout    Migraine    Migraines      No past surgical history on file.      Social History     Socioeconomic History    Marital status:    Tobacco Use    Smoking status: Never    Smokeless tobacco: Never   Vaping Use    Vaping status: Never Used   Substance and Sexual Activity    Alcohol use: Yes     Alcohol/week: 0.0 standard drinks of alcohol     Comment: occas    Drug use: No   Other Topics Concern    Caffeine Concern Yes     Comment: a few times per week    Exercise No    Weight Concern Yes   Social History Narrative    ** Merged History Encounter **              REVIEW OF SYSTEMS:   GENERAL: normal appetite  SKIN: no rashes or abnormal skin lesions  HEENT: See HPI  LUNGS: See HPI  CARDIOVASCULAR: denies chest pain or palpitations   GI: denies N/V/C or abdominal pain      EXAM:   /78   Pulse 90   Temp 97.6 °F (36.4 °C)    Resp 18   Ht 5' 9\" (1.753 m)   Wt 200 lb (90.7 kg)   SpO2 99%   BMI 29.53 kg/m²   GENERAL: well developed, well nourished,in no apparent distress  SKIN: no rashes,no suspicious lesions  HEAD: atraumatic, normocephalic.  (+) tenderness on palpation of maxillary sinuses  EYES: conjunctiva clear, EOM intact  EARS: TM's clear bilaterally  NOSE: Nostrils patent, clear/yellow nasal discharge, nasal mucosa erythematous/edematous   THROAT: Oral mucosa pink, moist. Posterior pharynx is mildly injected, thick post nasal drainage, uvula midline, no hypertrophy, no exudates .  NECK: Supple, non-tender  LUNGS: clear to auscultation bilaterally, no wheezes or rhonchi. Breathing is non labored.  CARDIO: RRR without murmur  EXTREMITIES: no cyanosis, clubbing or edema  LYMPH:  shotty ant cervical LAD.       ASSESSMENT AND PLAN:   Rojas Mckeon is a 34 year old male who presents with upper respiratory symptoms that are consistent with    ASSESSMENT:   Encounter Diagnoses   Name Primary?    Sore throat     Acute non-recurrent maxillary sinusitis Yes       PLAN: Meds as below.  Comfort care as described in Patient Instructions    Meds & Refills for this Visit:  Requested Prescriptions     Signed Prescriptions Disp Refills    amoxicillin clavulanate 875-125 MG Oral Tab 20 tablet 0     Sig: Take 1 tablet by mouth 2 (two) times daily for 10 days.     Risks, benefits, and side effects of medication explained and discussed.    The patient indicates understanding of these issues and agrees to the plan.  The patient is asked to f/u with PCP if sx's persist or worsen.  Patient Instructions     Augmentin 875 mg twice daily for 10 days.    Encourage fluids, humidifier/vaporizor at bedside, elevate head of bed (sleep with extra pillow), vapor rub to chest, steam therapy if no fever, warm compresses for sinus pressure if no fever, salt water gargles for sore throat, lozenges for sore throat, may try over the counter saline nasal spray or  irrigation kit (use distilled water with irrigation kit) for sinus pressure/congestion, get plenty of rest.    Follow up with your primary care provider if your symptoms fail to improve and resolve as anticipated    Go to the Immediate Care or Emergency Department in event of new or worsening symptoms at any time       Steph Cruz PA-C

## 2025-04-17 ENCOUNTER — APPOINTMENT (OUTPATIENT)
Dept: GENERAL RADIOLOGY | Age: 35
End: 2025-04-17
Attending: NURSE PRACTITIONER
Payer: OTHER GOVERNMENT

## 2025-04-17 ENCOUNTER — HOSPITAL ENCOUNTER (OUTPATIENT)
Age: 35
Discharge: HOME | End: 2025-04-17
Payer: OTHER GOVERNMENT

## 2025-04-17 VITALS
SYSTOLIC BLOOD PRESSURE: 121 MMHG | TEMPERATURE: 98 F | WEIGHT: 200 LBS | OXYGEN SATURATION: 100 % | HEART RATE: 84 BPM | RESPIRATION RATE: 16 BRPM | BODY MASS INDEX: 29.62 KG/M2 | DIASTOLIC BLOOD PRESSURE: 67 MMHG | HEIGHT: 69 IN

## 2025-04-17 DIAGNOSIS — M25.579 ANKLE PAIN: ICD-10-CM

## 2025-04-17 DIAGNOSIS — M79.673 FOOT PAIN: Primary | ICD-10-CM

## 2025-04-17 DIAGNOSIS — M72.2 PLANTAR FASCIITIS: ICD-10-CM

## 2025-04-17 PROCEDURE — 73610 X-RAY EXAM OF ANKLE: CPT | Performed by: NURSE PRACTITIONER

## 2025-04-17 PROCEDURE — 99214 OFFICE O/P EST MOD 30 MIN: CPT | Performed by: NURSE PRACTITIONER

## 2025-04-17 PROCEDURE — 73630 X-RAY EXAM OF FOOT: CPT | Performed by: NURSE PRACTITIONER

## 2025-04-17 RX ORDER — METHYLPREDNISOLONE 4 MG/1
TABLET ORAL
Qty: 1 EACH | Refills: 0 | Status: SHIPPED | OUTPATIENT
Start: 2025-04-17

## 2025-04-17 NOTE — ED PROVIDER NOTES
Patient Seen in: [facility]    History     Chief Complaint   Patient presents with    Ankle Pain     Stated Complaint: R Ankle Pain    HPI    HPI: Rojas Mckeon is a 34 year old male who presents after an injury to the right ankle and foot that occurred 3 days ago. The patient is able  to bear weight  but reports it being painful. .   Pain worse with movement and weight bearing. No knee pain. No other joint pain or injuries.     Past Medical History[1]    Past Surgical History[2]         Family History[3]    Short Social Hx on File[4]    Review of Systems    Positive for stated complaint: R Ankle Pain  Other systems are as noted in HPI.  Constitutional and vital signs reviewed.      All other systems reviewed and negative except as noted above.    PSFH elements reviewed from today and agreed except as otherwise stated in HPI.    Physical Exam     ED Triage Vitals [04/17/25 0958]   /67   Pulse 84   Resp 16   Temp 98 °F (36.7 °C)   Temp src Oral   SpO2 100 %   O2 Device None (Room air)       Current:/67   Pulse 84   Temp 98 °F (36.7 °C) (Oral)   Resp 16   Ht 175.3 cm (5' 9\")   Wt 90.7 kg   SpO2 100%   BMI 29.53 kg/m²         Physical Exam      MENTAL STATUS: Alert, oriented, and cooperative. No focal deficit  HEAD: Atraumatic  NECK: Supple, full range of motion without pain or paresthesias  EXTREMITIES: The right  foot is tender over plantar aspect with no redness, erythema.  There is no ligamentous instability . There is no notable deformity.  Achilles is palpated and intact functionally.  Full range of motion of the knee on that side without pain. No proximal tib fib tenderness.  NEURO:Sensation to touch is intact.  SKIN: No open wounds, no rashes.  PSYCH: Normal affect. Calm and cooperative.    ED Course   Labs Reviewed - No data to display  I have personally  reviewed available prior medical records for any recent pertinent discharge summaries/testing. Patient/family updated on  results and plan, a verbalized understanding and agreement with the plan.  I explained to the patient that emergent conditions may arise and to go to the ER for new, worsening or any persistent conditions. I've explained the importance of taking all medicatons as prescribed, follow up, and return precuations,  All questions answered.    Please note that this report has been produced using speech recognition software and may contain errors related to that system including, but not limited to, errors in grammar, punctuation, and spelling, as well as words and phrases that possibly may have been recognized inappropriately.  If there are any questions or concerns, contact the dictating provider for clarification.  [facility]   Radiology:  XR FOOT, COMPLETE (MIN 3 VIEWS), RIGHT (CPT=73630)  Result Date: 4/17/2025  CONCLUSION:   Negative for fracture or malalignment.  Normal mineralization.  Joint spaces are preserved.  Minimal soft tissue swelling of the dorsal midfoot.   LOCATION:  [facility]   Dictated by (CST): Aishwarya Shetty MD on 4/17/2025 at 10:55 AM     Finalized by (CST): Aishwarya Shetty MD on 4/17/2025 at 10:55 AM       XR ANKLE (MIN 3 VIEWS), RIGHT (CPT=73610)  Result Date: 4/17/2025  CONCLUSION:   Negative for fracture or malalignment.  Ankle mortise and joint spaces of the hindfoot are preserved.  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  Tiny Achilles heel spur.  Normal soft tissues.   LOCATION:  [facility]   Dictated by (CST): Aishwarya Shetty MD on 4/17/2025 at 10:54 AM     Finalized by (CST): Aishwarya Shetty MD on 4/17/2025 at 10:55 AM         Patient presents for injury to the extremity. History and physical exam as above.  X-rays were performed which did not reveal any acute fracture.  Range of motion is intact.  No overlying erythema, warmth or induration to indicate infection.  Extremity is neurovascularly intact.  No overlying abrasion or laceration. Presentation clinically consistent with sprain.  Instructions given on Rice therapy and over-the-counter medications for pain management.  This is most likely a plantar fasciitis.  Recommend close follow-up with PCP.  Discussed possibility of missed occult fracture and need for repeat imaging if pain is persistent after 2 weeks.  Return to ED precautions discussed with the patient.      Disposition and Plan     Clinical Impression:  1. Foot pain    2. Ankle pain    3. Plantar fasciitis        Disposition:  Discharge    Follow-up:  Dean Hsu, DO  [address] [facility] [address]  [phone]          Petra Tony, DPM  [address] [place] [address]  [phone]          Jordan Diamond, ORA  [address] [facility] [address]  [phone]            Medications Prescribed:  Discharge Medication List as of 4/17/2025 11:26 AM        START taking these medications    Details   methylPREDNISolone (MEDROL) 4 MG Oral Tablet Therapy Pack Dosepack: take as directed, Normal, Disp-1 each, R-0                            [1]   Past Medical History:   Gout    Migraine    Migraines   [2] History reviewed. No pertinent surgical history.  [3]   Family History  Problem Relation Age of Onset    Cancer Paternal Grandmother 75        Lung   [4]   Social History  Socioeconomic History    Marital status:    Tobacco Use    Smoking status: Never    Smokeless tobacco: Never   Vaping Use    Vaping status: Never Used   Substance and Sexual Activity    Alcohol use: Yes     Alcohol/week: 0.0 standard drinks of alcohol     Comment: occas    Drug use: No   Other Topics Concern    Caffeine Concern Yes     Comment: a few times per week    Exercise No    Weight Concern Yes   Social History Narrative    ** Merged History Encounter **

## 2025-04-17 NOTE — ED INITIAL ASSESSMENT (HPI)
Patient reports right foot/ankle pain that started 3 days ago, denies injury/trauma, denies obvious swelling, CMS intact, able to bear weight, pain worse with ambulation

## 2025-04-18 ENCOUNTER — OFFICE VISIT (OUTPATIENT)
Dept: NEUROLOGY | Facility: CLINIC | Age: 35
End: 2025-04-18
Payer: OTHER GOVERNMENT

## 2025-04-18 VITALS
HEART RATE: 70 BPM | WEIGHT: 200 LBS | BODY MASS INDEX: 29.62 KG/M2 | DIASTOLIC BLOOD PRESSURE: 58 MMHG | RESPIRATION RATE: 16 BRPM | HEIGHT: 69 IN | SYSTOLIC BLOOD PRESSURE: 96 MMHG

## 2025-04-18 DIAGNOSIS — G43.119 INTRACTABLE MIGRAINE WITH AURA WITHOUT STATUS MIGRAINOSUS: Primary | ICD-10-CM

## 2025-04-18 PROCEDURE — 99213 OFFICE O/P EST LOW 20 MIN: CPT | Performed by: OTHER

## 2025-04-18 RX ORDER — TOPIRAMATE 50 MG/1
50 TABLET, FILM COATED ORAL 2 TIMES DAILY
Qty: 180 TABLET | Refills: 3 | Status: SHIPPED | OUTPATIENT
Start: 2025-04-18

## 2025-04-18 NOTE — PATIENT INSTRUCTIONS
Refill policies:    Allow 2-3 business days for refills; controlled substances may take longer.  Contact your pharmacy at least 5 days prior to running out of medication and have them send an electronic request or submit request through the “request refill” option in your [employer] account.  Refills are not addressed on weekends; covering physicians do not authorize routine medications on weekends.  No narcotics or controlled substances are refilled after noon on Fridays or by on call physicians.  By law, narcotics must be electronically prescribed.  A 30 day supply with no refills is the maximum allowed.  If your prescription is due for a refill, you may be due for a follow up appointment.  To best provide you care, patients receiving routine medications need to be seen at least once a year.  Patients receiving narcotic/controlled substance medications need to be seen at least once every 3 months.  In the event that your preferred pharmacy does not have the requested medication in stock (e.g. Backordered), it is your responsibility to find another pharmacy that has the requested medication available.  We will gladly send a new prescription to that pharmacy at your request.    Scheduling Tests:    If your physician has ordered radiology tests such as MRI or CT scans, please contact Central Scheduling at [phone] right away to schedule the test.  Once scheduled, the [facility] Centralized Referral Team will work with your insurance carrier to obtain pre-certification or prior authorization.  Depending on your insurance carrier, approval may take 3-10 days.  It is highly recommended patients assure they have received an authorization before having a test performed.  If test is done without insurance authorization, patient may be responsible for the entire amount billed.      Precertification and Prior Authorizations:  If your physician has recommended that you have a procedure or additional testing performed the [facility]  Centralized Referral Team will contact your insurance carrier to obtain pre-certification or prior authorization.    You are strongly encouraged to contact your insurance carrier to verify that your procedure/test has been approved and is a COVERED benefit.  Although the [facility] Centralized Referral Team does its due diligence, the insurance carrier gives the disclaimer that \"Although the procedure is authorized, this does not guarantee payment.\"    Ultimately the patient is responsible for payment.   Thank you for your understanding in this matter.  Paperwork Completion:  If you require FMLA or disability paperwork for your recovery, please make sure to either drop it off or have it faxed to our office at [phone]. Be sure the form has your name and date of birth on it.  The form will be faxed to our Forms Department and they will complete it for you.  There is a 25$ fee for all forms that need to be filled out.  Please be aware there is a 10-14 day turnaround time.  You will need to sign a release of information (JAYDEN) form if your paperwork does not come with one.  You may call the Forms Department with any questions at [phone].  Their fax number is [phone].

## 2025-04-18 NOTE — PROGRESS NOTES
[facility] Progress Note    HPI  Chief Complaint   Patient presents with    Migraine     Topiramate preventive/rizatriptan abortive, notes no recent events, ~3 in last year, optimal abortive response w/ 1 dose needing second PRN       As per my initial H&P from 8/22/17:    \"Rojas Mckeon is a 26 year old, who presents for evaluation of headaches.       Patient states he has history of migraines since age 15 - gets aura of squiggly lines in left eye x30 minutes, then pain in R side of neck and behind R eye, which may progress to slurred speech and sometimes with confusion; also with numbness in both hands, along with nausea / dizziness; migraines usually lasts 5-6 hours and denies LOC.   Patient has had CT brain done in the past but never had MRI or EEG.  Patient denies migraine without aura.      He usually has tried sumatriptan in the past for migraines but this did not improve; he also states he is on amitripytline 50 mg nightly.      Usually he has 3-4  Migraines per month but has been having more frequent migraines since a head injury ~ 1 month ago.   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      He feels his current cycle of migraines is the same as usual but more frequent.  He notes triggers of acidic foods (ie orange juice); denies alcohol as a trigger.      He states he normally takes the sumatriptan within the first 30 minutes but did not have improvement.  He has also been started on fioricet for abortive therapy but does not noted improvement with the medication.      Patient states his father has similar migraines in the past up until age 30 but now only has aura.  Dad's father had heart surgery in his 60s but no other family history of early stroke.        Otherwise, patient denies any recent weight change, fevers, chills,  nausea, double vision/ blurry vision / loss of vision, chest pain, palpitations, shortness of breath, rashes, joint pains, bowel / bladder incontinence or mood issues. \"      Prior notes as per 12/27/2021.  Patient last seen 8/2020.  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  He states he had improvement with Maxalt but migraines still lasted ~ 6 hrs; pain would improve from 7 to 8 to 1 or 2 but he still would have recurrence daily over ~ 2 week time period. However, other than this instance, he has been doing well and remains on Topamax 50 mg bid.  On average, he is having only 0 to 1 migraines per month otherwise.  He denies any history of COVID-19 infection and denied any associated lack of taste or smell or shortness of breath with this instance; is fully vaccinated with booster in 10/2021 and denies any side effects.      He otherwise denies any side effects on Topamax 50 mg bid and denies  mood changes, difficulty with concentrating, or word finding, or tingling/paresthesias in the hands or feet.  In addition, he denies any kidney stones or pain with urination.        Prior notes as per 4/5/2023.  Patient last seen 12/27/2021.  He had been on Topamax 50 mg bid and states when he takes the medication, he has maybe 1 migraine per month.  He did run out of Topamax briefly for about a week in February 2023 and noted he had more frequent migraines when he was off the medication for ~ 4 days in a row and treated migraines with rizatriptan during this time.  He resumed taking Topamax at 50 mg bid in ~ mid February 2023 and since this time has been doing well and has not used rizatriptan recently. He denies any side effects on Topamax 50 mg bid; no cognitive changes, tingling/paresthesias in hands or feet and denies kidney stones or pain with urination.        Prior notes as per  6/28/2024.  Patient last seen 4/5/2023.  He remains on Topamax 50 mg bid.  He has only had 3 migraines in the past year; doing well; no side effects; last migraine 1 month ago and responded to rizatriptan 10 mg with one dose.          Patient last seen 6/28/2024.  He remains on Topamax 50 mg bid. He states he had ~ 3 migraines in the past year and last had one ~ 2 months ago and improved with rizatriptan 10 mg with one dose; migraines usually abort with one dose but he had to use 2 doses one time this past year. Migraines have typical presentation with vision changes and 'squiggly lines\" in left eye; this lasts ~20-30 minutes and no migraine occurs ~90% of the time if rizatriptan is taken early.       Past Medical History:    Gout    Migraine    Migraines     History reviewed. No pertinent surgical history.  Family History   Problem Relation Age of Onset    Cancer Paternal Grandmother 75        Lung     Social History     Socioeconomic History    Marital status:    Tobacco Use    Smoking status: Never    Smokeless tobacco: Never   Vaping Use    Vaping status: Never Used   Substance and Sexual Activity    Alcohol use: Yes     Alcohol/week: 0.0 standard drinks of alcohol     Comment: occas    Drug use: No   Other Topics Concern    Caffeine Concern Yes     Comment: a few times per week    Exercise No    Weight Concern Yes       No Known Allergies      Current Outpatient Medications:     topiramate 50 MG Oral Tab, Take 1 tablet (50 mg total) by mouth 2 (two) times daily., Disp: 180 tablet, Rfl: 3    methylPREDNISolone (MEDROL) 4 MG Oral Tablet Therapy Pack, Dosepack: take as directed, Disp: 1 each, Rfl: 0    Rizatriptan Benzoate 10 MG Oral Tab, Take 1 tablet (10 mg total) by mouth as needed for Migraine., Disp: 12 tablet, Rfl: 5    Review of Systems:  No chest pain or palpitations; otherwise as noted in HPI.    Exam:  BP 96/58 (BP Location: Left arm, Patient Position: Sitting, Cuff Size: adult)   Pulse 70    Resp 16   Ht 69\"   Wt 200 lb (90.7 kg)   BMI 29.53 kg/m²   Estimated body mass index is 29.53 kg/m² as calculated from the following:    Height as of this encounter: 69\".    Weight as of this encounter: 200 lb (90.7 kg).    Gen: well developed, well nourished, no acute distress  HEENT: normocephalic  Heart; normal S1/S2, regular rate and rhythm  Lungs: clear to auscultation bilaterally  Extremities: no edema, peripheral pulses intact    Neck: supple, full range of motion; no carotid bruits    Neuro:  Mental status:  Orientation: Alert and oriented to person, place, time  Speech Fluent and conversational    CN: PERRL, EOMI with no nystagmus, VFF, smile symmetric, sensation intact, tongue and palate midline, SCM/hearing intact, otherwise, CN 2-12 intact   Motor: 5/5 strength throughout, tone normal  Sensory: intact to light touch throughout  Coord: FNF intact with no tremor or dysmetria; rapid alternating movements intact bilaterally  Romberg: absent  Gait: normal casual, heel, toe and tandem gait    Labs:  None new    Imaging:  None new     Prior as noted below:   CT brain (7/25/17): report reviewed from OSH; no acute intracranial findings     Other procedures  None new  Prior as noted below:     EEG (9/19/17):   Impression:   This EEG is normal.  No epileptiform activity, abnormal slowing or clinical or electrographic seizures were noted on this awake and asleep  recording.            Impression/ Plan:  Rojas Mckeon is a 34 year old male with PMHx significant for chronic migraine with aura, and multiple concussions, who originally presented 8/2017, for evaluation and management.        Neurologic exam remains normal and nonfocal and CT brain with no evidence for secondary intracranial pathology.   EEG has been done and was negative for epileptiform activity or subclinical seizures, as well.     Migraines are infrequent and well controled overall on Topamax at 50 mg bid, with Rizatriptan 10 mg PRN; he was not  able to tolerate weaning Topamax as he had recurrence of severe migraine and prior pruritis previously improved with increased water intake - will continue same dose of 50 mg bid for prevention; discussed interplay between environmental triggers and migraine and need to monitor for triggers      Patient was counseled regarding conservative management, stress reduction techniques, moist heat, massage, and OTC anti-inflammatory medications as needed; also discussed optimal timing of migraine specific medications for abortive therapy to maximize effectiveness, and risk of medication overuse headache.    1. Intractable migraine with aura without status migrainosus  As noted above   - topiramate 50 MG Oral Tab; Take 1 tablet (50 mg total) by mouth 2 (two) times daily.  Dispense: 180 tablet; Refill: 3    Return in about 1 year (around 4/18/2026).    Stalin Younger MD, Neurology  [facility]  Pager [phone]  4/18/2025

## 2025-04-28 ENCOUNTER — TELEPHONE (OUTPATIENT)
Dept: FAMILY MEDICINE CLINIC | Facility: CLINIC | Age: 35
End: 2025-04-28

## 2025-07-02 ENCOUNTER — HOSPITAL ENCOUNTER (OUTPATIENT)
Age: 35
Discharge: HOME | End: 2025-07-02
Payer: OTHER GOVERNMENT

## 2025-07-02 ENCOUNTER — APPOINTMENT (OUTPATIENT)
Dept: GENERAL RADIOLOGY | Age: 35
End: 2025-07-02
Attending: NURSE PRACTITIONER
Payer: OTHER GOVERNMENT

## 2025-07-02 VITALS
HEART RATE: 57 BPM | RESPIRATION RATE: 16 BRPM | DIASTOLIC BLOOD PRESSURE: 62 MMHG | TEMPERATURE: 97 F | OXYGEN SATURATION: 100 % | SYSTOLIC BLOOD PRESSURE: 114 MMHG

## 2025-07-02 DIAGNOSIS — M25.579 ANKLE PAIN: Primary | ICD-10-CM

## 2025-07-02 DIAGNOSIS — M76.61 ACHILLES TENDINITIS OF RIGHT LOWER EXTREMITY: ICD-10-CM

## 2025-07-02 PROCEDURE — 99214 OFFICE O/P EST MOD 30 MIN: CPT | Performed by: NURSE PRACTITIONER

## 2025-07-02 PROCEDURE — 73610 X-RAY EXAM OF ANKLE: CPT | Performed by: NURSE PRACTITIONER

## 2025-07-02 RX ORDER — METHYLPREDNISOLONE 4 MG/1
TABLET ORAL
Qty: 1 EACH | Refills: 0 | Status: SHIPPED | OUTPATIENT
Start: 2025-07-02

## 2025-07-02 NOTE — ED INITIAL ASSESSMENT (HPI)
Patient reports right ankle/heel pain that started about 2 weeks ago and has been worsening, denies injury/trauma,  taking aleve and using RICE without relief, pain initially started in posterior ankle/achilles area, worse after ambulating/later in the day

## 2025-07-02 NOTE — ED PROVIDER NOTES
Patient Seen in: [facility]       The following individual(s) verbally consented to be recorded using ambient AI listening technology and understand that they can each withdraw their consent to this listening technology at any point by asking the clinician to turn off or pause the recording:    Patient name: Rojas Mckeon        History  Chief Complaint   Patient presents with    Ankle Pain     Stated Complaint: R foot pain    Subjective:   HPI     Rojas Mckeon is a 34 year old male who presents with foot and ankle pain, specifically Achilles tendon pain.    He has been experiencing severe pain in the Achilles tendon area for the past two weeks, described as a sensation of tightness and a feeling that the tendon is ready to pop. There is no history of specific injury or incident that could have caused the pain.    He has a history of problems with his right foot, though specific details about past issues are not provided. He engages in a lot of walking and runs occasionally, but not frequently. His home has mostly hardwood and vinyl ginger, with some tile in the entryway.    During the review of symptoms, the pain is confirmed to be located where the Achilles tendon inserts and is exacerbated by movement. No other associated symptoms or relevant medical history are reported.        Objective:     Past Medical History:    Gout    Migraine    Migraines              History reviewed. No pertinent surgical history.             Social History     Socioeconomic History    Marital status:    Tobacco Use    Smoking status: Never    Smokeless tobacco: Never   Vaping Use    Vaping status: Never Used   Substance and Sexual Activity    Alcohol use: Yes     Alcohol/week: 0.0 standard drinks of alcohol     Comment: occas    Drug use: No   Other Topics Concern    Caffeine Concern Yes     Comment: a few times per week    Exercise No    Weight Concern Yes   Social History Narrative    ** Merged History  Encounter **                   Review of Systems   Constitutional: Negative.    HENT: Negative.     Eyes: Negative.    Respiratory: Negative.     Cardiovascular: Negative.    Gastrointestinal: Negative.    Endocrine: Negative.    Genitourinary: Negative.    Musculoskeletal:  Positive for arthralgias and joint swelling.   Skin: Negative.    Allergic/Immunologic: Negative.    Neurological: Negative.    Hematological: Negative.    Psychiatric/Behavioral: Negative.         Positive for stated complaint: R foot pain  Other systems are as noted in HPI.  Constitutional and vital signs reviewed.      All other systems reviewed and negative except as noted above.                  Physical Exam    ED Triage Vitals [07/02/25 0950]   /62   Pulse 57   Resp 16   Temp 97.4 °F (36.3 °C)   Temp src Oral   SpO2 100 %   O2 Device None (Room air)       Current Vitals:   Vital Signs  BP: 114/62  Pulse: 57  Resp: 16  Temp: 97.4 °F (36.3 °C)  Temp src: Oral    Oxygen Therapy  SpO2: 100 %  O2 Device: None (Room air)            Physical Exam  Vitals and nursing note reviewed.   Constitutional:       Appearance: Normal appearance. He is normal weight.   HENT:      Head: Normocephalic.      Right Ear: External ear normal.      Left Ear: External ear normal.   Eyes:      Extraocular Movements: Extraocular movements intact.      Conjunctiva/sclera: Conjunctivae normal.      Pupils: Pupils are equal, round, and reactive to light.   Cardiovascular:      Rate and Rhythm: Normal rate and regular rhythm.      Pulses: Normal pulses.   Pulmonary:      Effort: Pulmonary effort is normal.   Musculoskeletal:         General: Swelling and tenderness present.      Comments: There is mild swelling appreciated to the right lateral malleolus.  Patient is tender to palpation to the Achilles insertion site.  Achilles tendon intact.  CMS intact.   Neurological:      Mental Status: He is alert.   Psychiatric:         Mood and Affect: Mood normal.                 ED Course  Labs Reviewed - No data to display                         [facility]     Rojas Mckeon is a 34 year old male who presents with foot and ankle pain, specifically Achilles tendon pain.    He has been experiencing severe pain in the Achilles tendon area for the past two weeks, described as a sensation of tightness and a feeling that the tendon is ready to pop. There is no history of specific injury or incident that could have caused the pain.    He has a history of problems with his right foot, though specific details about past issues are not provided. He engages in a lot of walking and runs occasionally, but not frequently. His home has mostly hardwood and vinyl ginger, with some tile in the entryway.    During the review of symptoms, the pain is confirmed to be located where the Achilles tendon inserts and is exacerbated by movement. No other associated symptoms or relevant medical history are reported.  Vital signs: Please see EMR.  Physical exam: Please see exam.  Differential diagnosis: Fracture, sprain, tendinitis.  Assessment & Plan  Achilles tendonitis  Achilles tendonitis in the right foot with pain, tightness, and swelling. Differential includes avulsion or hairline fracture.  - Order ankle x-ray to assess for fracture.  - Advised proper footwear and calf stretching exercises.    I have personally reviewed the x-ray of the right ankle and no acute abnormalities are appreciated.  Awaiting formal radiology read.    XR ANKLE (MIN 3 VIEWS), RIGHT (CPT=73610)  Result Date: 7/2/2025  CONCLUSION: No evidence of acute displaced fracture or dislocation in the right ankle. Electronically Verified and Signed by Attending Radiologist: Aaron Denney MD 7/2/2025 10:17 AM Workstation: [id number]    Based on physical exam and HPI will diagnose a tendinitis and placed patient on a Medrol Dosepak.  Patient is to follow-up with podiatry if symptoms persist and/or worsen within the next week.            Medical  Decision Making  Rojas Mckeon is a 34 year old male who presents with foot and ankle pain, specifically Achilles tendon pain.    He has been experiencing severe pain in the Achilles tendon area for the past two weeks, described as a sensation of tightness and a feeling that the tendon is ready to pop. There is no history of specific injury or incident that could have caused the pain.    He has a history of problems with his right foot, though specific details about past issues are not provided. He engages in a lot of walking and runs occasionally, but not frequently. His home has mostly hardwood and vinyl ginger, with some tile in the entryway.    During the review of symptoms, the pain is confirmed to be located where the Achilles tendon inserts and is exacerbated by movement. No other associated symptoms or relevant medical history are reported.    Problems Addressed:  Achilles tendinitis of right lower extremity: acute illness or injury  Ankle pain: acute illness or injury    Amount and/or Complexity of Data Reviewed  Radiology: ordered and independent interpretation performed. Decision-making details documented in ED Course.     Details: XR ANKLE (MIN 3 VIEWS), RIGHT (CPT=73610)  Result Date: 7/2/2025  CONCLUSION: No evidence of acute displaced fracture or dislocation in the right ankle. Electronically Verified and Signed by Attending Radiologist: Aaron Denney MD 7/2/2025 10:17 AM Workstation: [id number]        Risk  Prescription drug management.        Disposition and Plan     Clinical Impression:  1. Ankle pain    2. Achilles tendinitis of right lower extremity         Disposition:  Discharge  7/2/2025 10:22 am    Follow-up:  Marcus Bustos DPM  [address] [address]  [phone]    Schedule an appointment as soon as possible for a visit   If symptoms worsen          Medications Prescribed:  Current Discharge Medication List        START taking these medications     Details   methylPREDNISolone (MEDROL) 4 MG Oral Tablet Therapy Pack Dosepack: take as directed  Qty: 1 each, Refills: 0                   Supplementary Documentation:

## 2025-07-02 NOTE — DISCHARGE INSTRUCTIONS
VISIT SUMMARY:  You came in today because of severe pain in your Achilles tendon that has been bothering you for the past two weeks. The pain is described as tightness and a feeling that the tendon might pop, and it gets worse with movement. There is no history of a specific injury causing this pain.    YOUR PLAN:  -ACHILLES TENDONITIS: Achilles tendonitis is inflammation of the Achilles tendon, which connects your calf muscles to your heel bone. This condition can cause pain, tightness, and swelling in the affected area. To better understand the cause of your pain, we will order an ankle x-ray to check for any fractures. In the meantime, it is important to wear proper footwear and perform calf stretching exercises to help alleviate the symptoms.    Follow the advice on proper footwear and calf stretching exercises to help manage your symptoms. If the pain persists or worsens, please schedule a follow-up appointment.  Please take the steroid pack and if symptoms do not improve please follow-up with podiatry.    Contains text generated by Kayla

## 2025-07-08 DIAGNOSIS — G43.119 INTRACTABLE MIGRAINE WITH AURA WITHOUT STATUS MIGRAINOSUS: ICD-10-CM

## 2025-07-08 RX ORDER — TOPIRAMATE 50 MG/1
50 TABLET, FILM COATED ORAL 2 TIMES DAILY
Qty: 180 TABLET | Refills: 3 | OUTPATIENT
Start: 2025-07-08

## 2025-07-08 NOTE — TELEPHONE ENCOUNTER
Please decline RX request medication is too soon to fill due to remaining refills remain.  Medication: TOPIRAMATE 50MG TABLETS      Date of last refill: 04/18/2025 (#180/3)  [place] 07/08/2025

## 2025-08-06 ENCOUNTER — HOSPITAL ENCOUNTER (OUTPATIENT)
Age: 35
Discharge: HOME | End: 2025-08-06

## 2025-08-06 VITALS
OXYGEN SATURATION: 100 % | DIASTOLIC BLOOD PRESSURE: 71 MMHG | RESPIRATION RATE: 18 BRPM | SYSTOLIC BLOOD PRESSURE: 108 MMHG | WEIGHT: 190 LBS | HEIGHT: 69 IN | HEART RATE: 70 BPM | TEMPERATURE: 97 F | BODY MASS INDEX: 28.14 KG/M2

## 2025-08-06 DIAGNOSIS — M76.62 ACHILLES TENDONITIS, BILATERAL: Primary | ICD-10-CM

## 2025-08-06 DIAGNOSIS — M76.61 ACHILLES TENDONITIS, BILATERAL: Primary | ICD-10-CM

## 2025-08-06 PROCEDURE — 99213 OFFICE O/P EST LOW 20 MIN: CPT | Performed by: PHYSICIAN ASSISTANT

## 2025-08-06 RX ORDER — METHYLPREDNISOLONE 4 MG/1
TABLET ORAL
Qty: 1 EACH | Refills: 0 | Status: SHIPPED | OUTPATIENT
Start: 2025-08-06

## 2025-08-07 ENCOUNTER — TELEPHONE (OUTPATIENT)
Facility: CLINIC | Age: 35
End: 2025-08-07

## 2025-08-07 ENCOUNTER — HOSPITAL ENCOUNTER (OUTPATIENT)
Dept: GENERAL RADIOLOGY | Age: 35
Discharge: HOME | End: 2025-08-07
Attending: FAMILY MEDICINE

## 2025-08-07 ENCOUNTER — OFFICE VISIT (OUTPATIENT)
Facility: CLINIC | Age: 35
End: 2025-08-07

## 2025-08-07 VITALS — WEIGHT: 190 LBS | HEIGHT: 69 IN | BODY MASS INDEX: 28.14 KG/M2

## 2025-08-07 DIAGNOSIS — M25.572 LEFT ANKLE PAIN, UNSPECIFIED CHRONICITY: Primary | ICD-10-CM

## 2025-08-07 DIAGNOSIS — M24.172 DERANGEMENT OF ANKLE, LEFT: ICD-10-CM

## 2025-08-07 DIAGNOSIS — M25.572 LEFT ANKLE PAIN, UNSPECIFIED CHRONICITY: ICD-10-CM

## 2025-08-07 DIAGNOSIS — M67.88 ACHILLES TENDONOSIS: ICD-10-CM

## 2025-08-07 DIAGNOSIS — M24.171 DERANGEMENT OF ANKLE, RIGHT: Primary | ICD-10-CM

## 2025-08-07 DIAGNOSIS — M72.2 PLANTAR FASCIITIS: ICD-10-CM

## 2025-08-07 PROCEDURE — 73610 X-RAY EXAM OF ANKLE: CPT | Performed by: FAMILY MEDICINE

## 2025-08-07 PROCEDURE — 99214 OFFICE O/P EST MOD 30 MIN: CPT | Performed by: FAMILY MEDICINE

## 2025-08-31 ENCOUNTER — HOSPITAL ENCOUNTER (OUTPATIENT)
Dept: MRI IMAGING | Age: 35
End: 2025-08-31
Attending: FAMILY MEDICINE

## 2025-08-31 DIAGNOSIS — M24.171 DERANGEMENT OF ANKLE, RIGHT: ICD-10-CM

## 2025-08-31 DIAGNOSIS — M24.172 DERANGEMENT OF ANKLE, LEFT: ICD-10-CM

## 2025-08-31 PROCEDURE — 73721 MRI JNT OF LWR EXTRE W/O DYE: CPT | Performed by: FAMILY MEDICINE

## (undated) DIAGNOSIS — G43.119 INTRACTABLE MIGRAINE WITH AURA WITHOUT STATUS MIGRAINOSUS: ICD-10-CM

## (undated) NOTE — MR AVS SNAPSHOT
[place] [address] [facility] [place] [address]  [phone]               Thank you for choosing us for your health care visit with Peterson Alejandro DO.   We are glad to serve you and happy to provide you with this summary of Take 1 tablet (100 mg total) by mouth every 2 (two) hours as needed for Migraine.  Use at onset; repeat once after 2 HRS-ONLY 2 IN 24 HR MAX   Commonly known as:  IMITREX                Where to Get Your Medications      These medications were sent to [facility]

## (undated) NOTE — LETTER
Date: 8/9/2019    Patient Name: Ronnie Sneed          To Whom it may concern: This letter has been written at the patient's request. The above patient was seen at the [facility] for treatment of a medical condition.     This patient should

## (undated) NOTE — MR AVS SNAPSHOT
[address] [facility]  [place] [address]  [phone]               Thank you for choosing us for your health care visit with ANJANA Navarrete.   We are glad to serve you and happy to provide you with [facility] another medicine was prescribed. (Note: If you have chronic liver or kidney disease or have ever had a stomach ulcer or gastrointestinal bleeding, talk with your healthcare provider before using these medicines.  Also talk to your provider if you are taking · Trouble breathing, wheezing, or pain with breathing  Date Last Reviewed: 9/13/2015  © 2585-8667 [address], [address] [place] [address]. All rights reserved.  This information is not intended as a substitute for professional m other tests. Treatment for ABRS  Treatment may include:  · Antibiotic medicine. This is for symptoms that last for at least 10 to 14 days. · Nasal corticosteroid medicine. Drops or spray used in the nose can lessen swelling and congestion.   · Over-the-co benzonatate 200 MG Caps   Take 1 capsule (200 mg total) by mouth 3 (three) times daily as needed for cough.    Commonly known as:  TESSALON           Butalbital-APAP-Caffeine -40 MG Caps   1-2 po q 4-6 hrs prn   Commonly known as:  FIORICET

## (undated) NOTE — LETTER
08/22/17    Dear Dr. Felipe Wilson      Thank you for referring your patient, Bozena Clements to me for an evaluation. Please see my initial consult note enclosed below. Let me know if you have any questions.     Thank you  Crys Mario MD, Neurology  [facility] He states he normally takes the sumatriptan within the first 30 minutes but did not have improvement. He has also been started on fioricet for abortive therapy but does not noted improvement with the medication.       Patient states his father has simil /77 (BP Location: Left arm, Patient Position: Sitting, Cuff Size: adult)   Pulse 81   Resp 16   Ht 69\"   Wt 214 lb   BMI 31.60 kg/m²   Estimated body mass index is 31.6 kg/m² as calculated from the following:    Height as of this encounter: 69\". Gait:  Normal casual, heel, toe and tandem gait    TEST RESULTS/DATA REVIEWED:     CT brain (7/25/17): report reviewed from OSH; no acute intracranial findings     IMPRESSION AND PLAN:   Carmen Thornton is a 31year old male with PMHx significant for c to maintain adequate hydration on the medication     (G43.119) Intractable migraine with aura without status migrainosus  (primary encounter diagnosis)  Plan: Topiramate 25 MG Oral Capsule Sprinkle,         Rizatriptan Benzoate 10 MG Oral Tab        As not